# Patient Record
Sex: FEMALE | Race: BLACK OR AFRICAN AMERICAN | Employment: PART TIME | URBAN - METROPOLITAN AREA
[De-identification: names, ages, dates, MRNs, and addresses within clinical notes are randomized per-mention and may not be internally consistent; named-entity substitution may affect disease eponyms.]

---

## 2017-01-17 ENCOUNTER — OFFICE VISIT (OUTPATIENT)
Dept: ONCOLOGY | Age: 67
End: 2017-01-17

## 2017-01-17 VITALS
HEIGHT: 65 IN | DIASTOLIC BLOOD PRESSURE: 82 MMHG | RESPIRATION RATE: 16 BRPM | SYSTOLIC BLOOD PRESSURE: 121 MMHG | BODY MASS INDEX: 37.99 KG/M2 | HEART RATE: 68 BPM | OXYGEN SATURATION: 96 % | TEMPERATURE: 98.6 F | WEIGHT: 228 LBS

## 2017-01-17 DIAGNOSIS — C54.1 ENDOMETRIAL CANCER (HCC): Primary | ICD-10-CM

## 2017-01-17 NOTE — MR AVS SNAPSHOT
Visit Information Date & Time Provider Department Dept. Phone Encounter #  
 1/17/2017  1:15 PM MD Estela Tran [facility] [phone] Your Appointments 5/16/2017  9:45 AM  
Office Visit with MD Estela Tran [facility] [address]) Appt Note: 4mo f/u  
 [address] Upcoming Health Maintenance Date Due Hepatitis C Screening 1950 Pneumococcal 19-64 Highest Risk (1 of 3 - PCV13) 3/23/1971 DTaP/Tdap/Td series (1 - Tdap) 3/23/1973 PAP AKA CERVICAL CYTOLOGY 3/23/1973 BREAST CANCER SCRN MAMMOGRAM 3/23/2002 FOBT Q 1 YEAR AGE 50-75 3/23/2002 ZOSTER VACCINE AGE 60> 3/23/2012 INFLUENZA AGE 9 TO ADULT 8/1/2016 Allergies as of 1/17/2017  Review Complete On: 1/17/2017 By: Myra Gleason MD  
  
 Severity Noted Reaction Type Reactions Shellfish Derived  12/19/2016    Other (comments) Was told not to take it. Current Immunizations  Reviewed on 12/19/2016 No immunizations on file. Not reviewed this visit You Were Diagnosed With   
  
 Codes Comments [facility]    -  Primary ICD-10-CM: C54.1 ICD-9-CM: 182. 0 Vitals BP Pulse Temp Resp Height(growth percentile) Weight(growth percentile) 121/82 (BP 1 Location: Right arm, BP Patient Position: Sitting) 68 98.6 °F (37 °C) (Oral) 16 5' 5\" (1.651 m) 228 lb (103.4 kg) SpO2 BMI OB Status Smoking Status 96% 37.94 kg/m2 Hysterectomy Current Every Day Smoker Vitals History BMI and BSA Data Body Mass Index Body Surface Area  
 37.94 kg/m 2 2.18 m 2 Preferred Pharmacy Pharmacy Name Phone [facility] [phone] Your Updated Medication List  
  
   
 This list is accurate as of: 1/17/17  2:19 PM.  Always use your most recent med list.  
  
  
  
  
 ALEVE 220 mg Cap Generic drug:  naproxen sodium Take  by mouth as needed. allopurinol 100 mg tablet Commonly known as:  Genevieve Bolivar Take 100 mg by mouth daily (after breakfast). ECOTRIN 325 mg tablet Generic drug:  aspirin delayed-release Take 325 mg by mouth daily. hydroCHLOROthiazide 25 mg tablet Commonly known as:  HYDRODIURIL Take 25 mg by mouth daily. levothyroxine 100 mcg tablet Commonly known as:  SYNTHROID Take  by mouth Daily (before breakfast). meloxicam 7.5 mg tablet Commonly known as:  MOBIC Take  by mouth daily. oxyCODONE-acetaminophen 5-325 mg per tablet Commonly known as:  PERCOCET Take 2 Tabs by mouth every four (4) hours as needed. Max Daily Amount: 12 Tabs. Indications: PAIN  
  
 potassium chloride SA 10 mEq capsule Commonly known as:  Bam Benders Take 10 mEq by mouth two (2) times a day. pravastatin 20 mg tablet Commonly known as:  PRAVACHOL Take 20 mg by mouth nightly. traMADol 50 mg tablet Commonly known as:  ULTRAM  
Take 50 mg by mouth every six (6) hours as needed for Pain. VITAMIN D3 2,000 unit Tab Generic drug:  cholecalciferol (vitamin D3) Take  by mouth daily. Introducing [facility]! Kerri Chilel introduces [employer]sembl[employer] patient portal. Now you can access parts of your medical record, email your doctor's office, and request medication refills online. 1. In your internet browser, go to [email or web address] 2. Click on the First Time User? Click Here link in the Sign In box. You will see the New Member Sign Up page. 3. Enter your Assembla Access Code exactly as it appears below. You will not need to use this code after youve completed the sign-up process. If you do not sign up before the expiration date, you must request a new code. · Multistat Access Code: [email or web address] Expires: 3/13/2017 10:37 AM 
 
4. Enter the last four digits of your Social Security Number (xxxx) and Date of Birth (mm/dd/yyyy) as indicated and click Submit. You will be taken to the next sign-up page. 5. Create a Multistat ID. This will be your Multistat login ID and cannot be changed, so think of one that is secure and easy to remember. 6. Create a Multistat password. You can change your password at any time. 7. Enter your Password Reset Question and Answer. This can be used at a later time if you forget your password. 8. Enter your e-mail address. You will receive e-mail notification when new information is available in [address] 9. Click Sign Up. You can now view and download portions of your medical record. 10. Click the Download Summary menu link to download a portable copy of your medical information. If you have questions, please visit the Frequently Asked Questions section of the Multistat website. Remember, Multistat is NOT to be used for urgent needs. For medical emergencies, dial 911. Now available from your iPhone and Android! Please provide this summary of care documentation to your next provider. Your primary care clinician is listed as Jak Mcallister. If you have any questions after today's visit, please call [phone].

## 2017-01-17 NOTE — PROGRESS NOTES
[facility]  [address] [place] [address], [address], [address] [id number] [phone]  Nanette Walton       Postoperative Office Note  Patient ID:  Name: Ronna Mejia  MRM: [id number]  : 1950/66 y.o. Date: 2017    SUBJECTIVE:    This is a 61 y.o.   female new diagnosis of Stage IAG2 endometrial cancer s/p TLH and BSO on 2016  Currently she has no problems with eating, bowel movements, voiding, or their wound  Appetite is normal. Eating a regular diet without difficulty. Urinating without difficulty. Bowel movements are regular. The patient is not having any pain. Her pathology revealed: 16     SPECIMEN:   Uterus, cervix, bilateral fallopian tubes, bilateral ovaries. PROCEDURE: SIMPLE HYSTERECTOMY, BILATERAL SALPINGO-OOPHORECTOMY. LYMPH NODE SAMPLING: NOT PERFORMED. SPECIMEN INTEGRITY: INTACT HYSTERECTOMY SPECIMEN. TUMOR SIZE: 4.4 X 4.0 X 1.5 CM. HISTOLOGIC TYPE: ENDOMETRIOID ADENOCARCINOMA. HISTOLOGIC GRADE: FIGO GRADE 2. MYOMETRIAL INVASION: NOT IDENTIFIED. TUMOR INVOLVEMENT OF CERVIX: NOT INVOLVED. EXTENT OF INVOLVEMENT OF OTHER ORGANS:   Right Ovary: Not involved. Left Ovary: Not involved. Right Fallopian Tube: Not involved. Left Fallopian Tube: Not involved. Serosa: Not involved. PERITONEAL WASHINGS (Jey Cowan): NO MALIGNANT CELLS FOUND. LYMPH/VASCULAR INVASION: NOT IDENTIFIED. PATHOLOGIC STAGING: pT1a, NX, MX.   ANCILLARY STUDIES:   Mismatch Repair IHC Panel: Pending. To be issued as an addendum. Estrogen Receptor (ER) Status: POSITIVE (95%). Progesterone Receptor (PgR) Status: POSITIVE (95%). Medications:     Current Outpatient Prescriptions on File Prior to Visit   Medication Sig Dispense Refill    aspirin delayed-release (ECOTRIN) 325 mg tablet Take 325 mg by mouth daily.       cholecalciferol, vitamin D3, (VITAMIN D3) 2,000 unit tab Take  by mouth daily.      potassium chloride SA (MICRO-K) 10 mEq capsule Take 10 mEq by mouth two (2) times a day.  hydroCHLOROthiazide (HYDRODIURIL) 25 mg tablet Take 25 mg by mouth daily.  levothyroxine (SYNTHROID) 100 mcg tablet Take  by mouth Daily (before breakfast).  pravastatin (PRAVACHOL) 20 mg tablet Take 20 mg by mouth nightly.  allopurinol (ZYLOPRIM) 100 mg tablet Take 100 mg by mouth daily (after breakfast).  meloxicam (MOBIC) 7.5 mg tablet Take  by mouth daily.  naproxen sodium (ALEVE) 220 mg cap Take  by mouth as needed.  oxyCODONE-acetaminophen (PERCOCET) 5-325 mg per tablet Take 2 Tabs by mouth every four (4) hours as needed. Max Daily Amount: 12 Tabs. Indications: PAIN 60 Tab 0    traMADol (ULTRAM) 50 mg tablet Take 50 mg by mouth every six (6) hours as needed for Pain. No current facility-administered medications on file prior to visit. Allergies: Allergies   Allergen Reactions    Shellfish Derived Other (comments)     Was told not to take it. OBJECTIVE:    Vitals:   Visit Vitals    /82 (BP 1 Location: Right arm, BP Patient Position: Sitting)    Pulse 68    Temp 98.6 °F (37 °C) (Oral)    Resp 16    Ht 5' 5\" (1.651 m)    Wt 103.4 kg (228 lb)    SpO2 96%    BMI 37.94 kg/m2       Physical Examination:    General:  alert, cooperative, no distress   Abdomen: soft, bowel sounds active, non-tender   Incision: healing well   Pelvic: NEFG, Spec exam revealed vaginal cuff intact, BME revealed vaginal cuff intact, nontender   Rectal: not done   Extremity:   extremities normal, atraumatic, no cyanosis or edema     IMPRESSION/PLAN:    Terri Campbell is Doing well postoperatively. .  She has a working diagnosis of Stage IAG2 endometrial cancer.   -reviewed her pathology and favorable prognosis with low risk of recurrence and no need for adjuvant treatment   -reviewed surveillance strategy including exams every 4 months x 2 years, then every 6 months x 3 years then annually   -discussed s/sx of recurrence   -all of patients questions and concerns address   -will plan for f/u in 4 months      [facility]  1/17/2017/12:56 PM

## 2017-05-16 ENCOUNTER — OFFICE VISIT (OUTPATIENT)
Dept: ONCOLOGY | Age: 67
End: 2017-05-16

## 2017-05-16 VITALS
HEART RATE: 62 BPM | WEIGHT: 225 LBS | OXYGEN SATURATION: 96 % | RESPIRATION RATE: 18 BRPM | BODY MASS INDEX: 37.44 KG/M2 | DIASTOLIC BLOOD PRESSURE: 75 MMHG | SYSTOLIC BLOOD PRESSURE: 125 MMHG | TEMPERATURE: 98.1 F

## 2017-05-16 DIAGNOSIS — Z85.42 HISTORY OF ENDOMETRIAL CANCER: Primary | ICD-10-CM

## 2017-05-16 NOTE — MR AVS SNAPSHOT
Visit Information Date & Time Provider Department Dept. Phone Encounter #  
 5/16/2017  9:45 AM MD Yue Morgan [facility] [phone] [id number] Your Appointments 5/16/2017  9:45 AM  
Office Visit with MD Yue Morgan [facility]) Appt Note: 4mo f/u  
 [address] 9/15/2017  9:00 AM  
Office Visit with MD Yue Morgan [facility]) Appt Note: 4MO F/U  
 [address]  
[phone] Upcoming Health Maintenance Date Due Hepatitis C Screening 1950 DTaP/Tdap/Td series (1 - Tdap) 3/23/1973 BREAST CANCER SCRN MAMMOGRAM 3/23/2002 FOBT Q 1 YEAR AGE 50-75 3/23/2002 ZOSTER VACCINE AGE 60> 3/23/2012 GLAUCOMA SCREENING Q2Y 3/23/2017 OSTEOPOROSIS SCREENING (DEXA) 3/23/2017 Pneumococcal 65+ High/Highest Risk (1 of 2 - PCV13) 3/23/2017 MEDICARE YEARLY EXAM 3/23/2017 INFLUENZA AGE 9 TO ADULT 8/1/2017 Allergies as of 5/16/2017  Review Complete On: 5/16/2017 By: Stewart Diaz MD  
  
 Severity Noted Reaction Type Reactions Shellfish Derived  12/19/2016    Other (comments) Was told not to take it. Current Immunizations  Reviewed on 12/19/2016 No immunizations on file. Not reviewed this visit You Were Diagnosed With   
  
 Codes Comments History of endometrial cancer    -  Primary ICD-10-CM: Z85.42 
ICD-9-CM: V10.42 Vitals BP Pulse Temp Resp Weight(growth percentile) SpO2  
 125/75 62 98.1 °F (36.7 °C) (Oral) 18 225 lb (102.1 kg) 96% BMI OB Status Smoking Status 37.44 kg/m2 Hysterectomy Current Every Day Smoker BMI and BSA Data Body Mass Index Body Surface Area 37.44 kg/m 2 2.16 m 2 Preferred Pharmacy Pharmacy Name Phone 100 Kimani M[facility] [phone] Your Updated Medication List  
  
   
This list is accurate as of: 5/16/17  9:33 AM.  Always use your most recent med list.  
  
  
  
  
 ALEVE 220 mg Cap Generic drug:  naproxen sodium Take  by mouth as needed. allopurinol 100 mg tablet Commonly known as:  Ro Poisson Take 100 mg by mouth daily (after breakfast). ECOTRIN 325 mg tablet Generic drug:  aspirin delayed-release Take 325 mg by mouth daily. hydroCHLOROthiazide 25 mg tablet Commonly known as:  HYDRODIURIL Take 25 mg by mouth daily. levothyroxine 100 mcg tablet Commonly known as:  SYNTHROID Take  by mouth Daily (before breakfast). meloxicam 7.5 mg tablet Commonly known as:  MOBIC Take  by mouth daily. oxyCODONE-acetaminophen 5-325 mg per tablet Commonly known as:  PERCOCET Take 2 Tabs by mouth every four (4) hours as needed. Max Daily Amount: 12 Tabs. Indications: PAIN  
  
 potassium chloride SA 10 mEq capsule Commonly known as:  Jaci Cram Take 10 mEq by mouth two (2) times a day. pravastatin 20 mg tablet Commonly known as:  PRAVACHOL Take 20 mg by mouth nightly. traMADol 50 mg tablet Commonly known as:  ULTRAM  
Take 50 mg by mouth every six (6) hours as needed for Pain. VITAMIN D3 2,000 unit Tab Generic drug:  cholecalciferol (vitamin D3) Take  by mouth daily. Introducing [facility] & HEALTH SERVICES! [facility] introduces [employer] patient portal. Now you can access parts of your medical record, email your doctor's office, and request medication refills online. 1. In your internet browser, go to [email or web address] 2. Click on the First Time User? Click Here link in the Sign In box. You will see the New Member Sign Up page. 3. Enter your Sweetgreen Access Code exactly as it appears below. You will not need to use this code after youve completed the sign-up process. If you do not sign up before the expiration date, you must request a new code. · Sweetgreen Access Code: [email or web address] Expires: 8/14/2017  9:33 AM 
 
4. Enter the last four digits of your Social Security Number (xxxx) and Date of Birth (mm/dd/yyyy) as indicated and click Submit. You will be taken to the next sign-up page. 5. Create a Sweetgreen ID. This will be your Sweetgreen login ID and cannot be changed, so think of one that is secure and easy to remember. 6. Create a Sweetgreen password. You can change your password at any time. 7. Enter your Password Reset Question and Answer. This can be used at a later time if you forget your password. 8. Enter your e-mail address. You will receive e-mail notification when new information is available in [address] 9. Click Sign Up. You can now view and download portions of your medical record. 10. Click the Download Summary menu link to download a portable copy of your medical information. If you have questions, please visit the Frequently Asked Questions section of the Sweetgreen website. Remember, Sweetgreen is NOT to be used for urgent needs. For medical emergencies, dial 911. Now available from your iPhone and Android! Please provide this summary of care documentation to your next provider. Your primary care clinician is listed as Virginia Tillman. If you have any questions after today's visit, please call [phone].

## 2017-05-16 NOTE — PROGRESS NOTES
[facility]  [facility], [address], [address] [place]  [phone]  Dorothy Meadows DO      Patient ID:  Name: Soha Officer  MRM: [id number]  : 1950/67 y.o. Date: 2017    SUBJECTIVE:  This is a 74 y.o.   female new diagnosis of Stage IAG2 endometrial cancer s/p TLH and BSO on 2016. Patient denies any Gyn symptoms. Patient specifically denies any abnormal vaginal bleeding, vaginal discharge, or vaginal irritation. Patient also denies abdominal pain, pelvic pain, or abdominal bloating. Patient is voiding without difficulty and bowel movements are regular. Her pathology revealed: 16  SPECIMEN:   Uterus, cervix, bilateral fallopian tubes, bilateral ovaries. PROCEDURE: SIMPLE HYSTERECTOMY, BILATERAL SALPINGO-OOPHORECTOMY. LYMPH NODE SAMPLING: NOT PERFORMED. SPECIMEN INTEGRITY: INTACT HYSTERECTOMY SPECIMEN. TUMOR SIZE: 4.4 X 4.0 X 1.5 CM. HISTOLOGIC TYPE: ENDOMETRIOID ADENOCARCINOMA. HISTOLOGIC GRADE: FIGO GRADE 2. MYOMETRIAL INVASION: NOT IDENTIFIED. TUMOR INVOLVEMENT OF CERVIX: NOT INVOLVED. EXTENT OF INVOLVEMENT OF OTHER ORGANS:   Right Ovary: Not involved. Left Ovary: Not involved. Right Fallopian Tube: Not involved. Left Fallopian Tube: Not involved. Serosa: Not involved. PERITONEAL WASHINGS ([place] Null): NO MALIGNANT CELLS FOUND. LYMPH/VASCULAR INVASION: NOT IDENTIFIED. PATHOLOGIC STAGING: pT1a, NX, MX.   ANCILLARY STUDIES:   Mismatch Repair IHC Panel: Pending. To be issued as an addendum. Estrogen Receptor (ER) Status: POSITIVE (95%). Progesterone Receptor (PgR) Status: POSITIVE (95%). Medications:     Current Outpatient Prescriptions on File Prior to Visit   Medication Sig Dispense Refill    aspirin delayed-release (ECOTRIN) 325 mg tablet Take 325 mg by mouth daily.       cholecalciferol, vitamin D3, (VITAMIN D3) 2,000 unit tab Take  by mouth daily.  potassium chloride SA (MICRO-K) 10 mEq capsule Take 10 mEq by mouth two (2) times a day.  hydroCHLOROthiazide (HYDRODIURIL) 25 mg tablet Take 25 mg by mouth daily.  levothyroxine (SYNTHROID) 100 mcg tablet Take  by mouth Daily (before breakfast).  pravastatin (PRAVACHOL) 20 mg tablet Take 20 mg by mouth nightly.  allopurinol (ZYLOPRIM) 100 mg tablet Take 100 mg by mouth daily (after breakfast).  meloxicam (MOBIC) 7.5 mg tablet Take  by mouth daily.  traMADol (ULTRAM) 50 mg tablet Take 50 mg by mouth every six (6) hours as needed for Pain.  naproxen sodium (ALEVE) 220 mg cap Take  by mouth as needed.  oxyCODONE-acetaminophen (PERCOCET) 5-325 mg per tablet Take 2 Tabs by mouth every four (4) hours as needed. Max Daily Amount: 12 Tabs. Indications: PAIN 60 Tab 0     No current facility-administered medications on file prior to visit. Allergies: Allergies   Allergen Reactions    Shellfish Derived Other (comments)     Was told not to take it.         OBJECTIVE:    Vitals:   Visit Vitals    /75    Pulse 62    Temp 98.1 °F (36.7 °C) (Oral)    Resp 18    Wt 102.1 kg (225 lb)    SpO2 96%    BMI 37.44 kg/m2       Physical Examination:    General:  alert, cooperative, no distress   Abdomen: soft, bowel sounds active, non-tender   Incision: healing well   Pelvic: NEFG, Spec exam revealed vaginal cuff intact, BME revealed vaginal cuff intact, nontender   Rectal: not done   Extremity:   extremities normal, atraumatic, no cyanosis or edema     IMPRESSION/PLAN:     Stage IAG2 endometrial cancer, who is clinically MITESH   -reviewed her pathology and favorable prognosis with low risk of recurrence and no need for adjuvant treatment   -reviewed surveillance strategy including exams every 4 months x 2 years, then every 6 months x 3 years then annually   -discussed s/sx of recurrence   -all of patients questions and concerns address   -will plan for f/u in 4 months    Total time spent was 25 minutes regarding diagnosis of endometrial cancer and >50% of this time was spent counseling and coordinating care.     Eli Everett DO  Gynecologic Oncology  5/16/2017/12:56 PM

## 2018-01-02 ENCOUNTER — OFFICE VISIT (OUTPATIENT)
Dept: ONCOLOGY | Age: 68
End: 2018-01-02

## 2018-01-02 ENCOUNTER — TELEPHONE (OUTPATIENT)
Dept: ONCOLOGY | Age: 68
End: 2018-01-02

## 2018-01-02 VITALS
RESPIRATION RATE: 18 BRPM | WEIGHT: 238.6 LBS | BODY MASS INDEX: 39.75 KG/M2 | HEART RATE: 86 BPM | SYSTOLIC BLOOD PRESSURE: 131 MMHG | HEIGHT: 65 IN | OXYGEN SATURATION: 98 % | TEMPERATURE: 98.1 F | DIASTOLIC BLOOD PRESSURE: 78 MMHG

## 2018-01-02 DIAGNOSIS — C54.1 ENDOMETRIAL CANCER (HCC): Primary | ICD-10-CM

## 2018-01-02 RX ORDER — LOSARTAN POTASSIUM AND HYDROCHLOROTHIAZIDE 12.5; 5 MG/1; MG/1
TABLET ORAL
COMMUNITY
Start: 2017-11-07

## 2018-01-02 NOTE — PROGRESS NOTES
[facility]  [address] [place] [address], [address], [address] [address] [phone]  Amrit Perry DO      Patient ID:  Name: Tata Ricardo  MRM: [id number]  : 1950/67 y.o. Date: 2018    SUBJECTIVE:  This is a 74 y.o.   female new diagnosis of Stage IAG2 endometrial cancer s/p TLH and BSO on 2016. Patient denies any Gyn symptoms. Patient specifically denies any abnormal vaginal bleeding, vaginal discharge, or vaginal irritation. Patient also denies abdominal pain, pelvic pain, or abdominal bloating. Patient is voiding without difficulty and bowel movements are regular. Her pathology revealed: 16  SPECIMEN:   Uterus, cervix, bilateral fallopian tubes, bilateral ovaries. PROCEDURE: SIMPLE HYSTERECTOMY, BILATERAL SALPINGO-OOPHORECTOMY. LYMPH NODE SAMPLING: NOT PERFORMED. SPECIMEN INTEGRITY: INTACT HYSTERECTOMY SPECIMEN. TUMOR SIZE: 4.4 X 4.0 X 1.5 CM. HISTOLOGIC TYPE: ENDOMETRIOID ADENOCARCINOMA. HISTOLOGIC GRADE: FIGO GRADE 2. MYOMETRIAL INVASION: NOT IDENTIFIED. TUMOR INVOLVEMENT OF CERVIX: NOT INVOLVED. EXTENT OF INVOLVEMENT OF OTHER ORGANS:   Right Ovary: Not involved. Left Ovary: Not involved. Right Fallopian Tube: Not involved. Left Fallopian Tube: Not involved. Serosa: Not involved. PERITONEAL WASHINGS ([facility]): NO MALIGNANT CELLS FOUND. LYMPH/VASCULAR INVASION: NOT IDENTIFIED. PATHOLOGIC STAGING: pT1a, NX, MX.   ANCILLARY STUDIES:   Mismatch Repair IHC Panel: Pending. To be issued as an addendum. Estrogen Receptor (ER) Status: POSITIVE (95%). Progesterone Receptor (PgR) Status: POSITIVE (95%). Medications:     Current Outpatient Prescriptions on File Prior to Visit   Medication Sig Dispense Refill    aspirin delayed-release (ECOTRIN) 325 mg tablet Take 325 mg by mouth daily.       cholecalciferol, vitamin D3, (VITAMIN D3) 2,000 unit tab Take  by mouth daily.      levothyroxine (SYNTHROID) 100 mcg tablet Take  by mouth Daily (before breakfast).  pravastatin (PRAVACHOL) 20 mg tablet Take 20 mg by mouth nightly.  allopurinol (ZYLOPRIM) 100 mg tablet Take 100 mg by mouth daily (after breakfast).  meloxicam (MOBIC) 7.5 mg tablet Take  by mouth daily.  traMADol (ULTRAM) 50 mg tablet Take 50 mg by mouth every six (6) hours as needed for Pain.  oxyCODONE-acetaminophen (PERCOCET) 5-325 mg per tablet Take 2 Tabs by mouth every four (4) hours as needed. Max Daily Amount: 12 Tabs. Indications: PAIN 60 Tab 0    potassium chloride SA (MICRO-K) 10 mEq capsule Take 10 mEq by mouth two (2) times a day.  naproxen sodium (ALEVE) 220 mg cap Take  by mouth as needed. No current facility-administered medications on file prior to visit. Allergies: Allergies   Allergen Reactions    Shellfish Derived Other (comments)     Was told not to take it.         OBJECTIVE:    Vitals:   Visit Vitals    /78 (BP 1 Location: Right arm, BP Patient Position: Sitting)    Pulse 86    Temp 98.1 °F (36.7 °C) (Oral)    Resp 18    Ht 5' 5\" (1.651 m)    Wt 108.2 kg (238 lb 9.6 oz)    SpO2 98%    BMI 39.71 kg/m2       Physical Examination:    General:  alert, cooperative, no distress   Abdomen: soft, bowel sounds active, non-tender   Incision: healing well   Pelvic: NEFG, Spec exam revealed vaginal cuff intact, BME revealed vaginal cuff intact, nontender   Rectal: not done   Extremity:   extremities normal, atraumatic, no cyanosis or edema     IMPRESSION/PLAN:     Stage IAG2 endometrial cancer, who is clinically MITESH   -reviewed her pathology and favorable prognosis with low risk of recurrence and no need for adjuvant treatment   -reviewed surveillance strategy including exams every 4 months x 2 years, then every 6 months x 3 years then annually   -discussed s/sx of recurrence   -all of patients questions and concerns address   -will plan for f/u in 4 months    Total time spent was 25 minutes regarding diagnosis of endometrial cancer and >50% of this time was spent counseling and coordinating care.     Brody GONZALEZ-[address]  Gynecologic Oncology  1/2/2018/12:56 PM

## 2018-01-02 NOTE — MR AVS SNAPSHOT
Visit Information Date & Time Provider Department Dept. Phone Encounter #  
 1/2/2018  9:45 AM Justina Juarez MD [facility] [phone] Upcoming Health Maintenance Date Due Hepatitis C Screening 1950 DTaP/Tdap/Td series (1 - Tdap) 3/23/1973 FOBT Q 1 YEAR AGE 50-75 3/23/2002 ZOSTER VACCINE AGE 60> 1/23/2012 GLAUCOMA SCREENING Q2Y 3/23/2017 OSTEOPOROSIS SCREENING (DEXA) 3/23/2017 Pneumococcal 65+ High/Highest Risk (1 of 2 - PCV13) 3/23/2017 MEDICARE YEARLY EXAM 3/23/2017 Influenza Age 5 to Adult 8/1/2017 Allergies as of 1/2/2018  Review Complete On: 1/2/2018 By: Arlene Cannon NP Severity Noted Reaction Type Reactions Shellfish Derived  12/19/2016    Other (comments) Was told not to take it. Current Immunizations  Reviewed on 12/19/2016 No immunizations on file. Not reviewed this visit You Were Diagnosed With   
  
 Codes Comments Endometrial cancer ([facility]    -  Primary ICD-10-CM: C54.1 ICD-9-CM: 182. 0 Vitals BP Pulse Temp Resp Height(growth percentile) Weight(growth percentile) 131/78 (BP 1 Location: Right arm, BP Patient Position: Sitting) 86 98.1 °F (36.7 °C) (Oral) 18 5' 5\" (1.651 m) 238 lb 9.6 oz (108.2 kg) SpO2 BMI OB Status Smoking Status 98% 39.71 kg/m2 Hysterectomy Former Smoker BMI and BSA Data Body Mass Index Body Surface Area  
 39.71 kg/m 2 2.23 m 2 Preferred Pharmacy Pharmacy Name Phone [address] [facility] [phone] Your Updated Medication List  
  
   
This list is accurate as of: 1/2/18 10:03 AM.  Always use your most recent med list.  
  
  
  
  
 allopurinol 100 mg tablet Commonly known as:  Nena Gallus Take 100 mg by mouth daily (after breakfast). ECOTRIN 325 mg tablet Generic drug:  aspirin delayed-release Take 325 mg by mouth daily. levothyroxine 100 mcg tablet Commonly known as:  SYNTHROID Take  by mouth Daily (before breakfast). losartan-hydroCHLOROthiazide 50-12.5 mg per tablet Commonly known as:  HYZAAR  
  
 meloxicam 7.5 mg tablet Commonly known as:  MOBIC Take  by mouth daily. oxyCODONE-acetaminophen 5-325 mg per tablet Commonly known as:  PERCOCET Take 2 Tabs by mouth every four (4) hours as needed. Max Daily Amount: 12 Tabs. Indications: PAIN  
  
 pravastatin 20 mg tablet Commonly known as:  PRAVACHOL Take 20 mg by mouth nightly. traMADol 50 mg tablet Commonly known as:  ULTRAM  
Take 50 mg by mouth every six (6) hours as needed for Pain. VITAMIN D3 2,000 unit Tab Generic drug:  cholecalciferol (vitamin D3) Take  by mouth daily. Introducing [facility]! [facility] introduces Acer patient portal. Now you can access parts of your medical record, email your doctor's office, and request medication refills online. 1. In your internet browser, go to [email or web address] 2. Click on the First Time User? Click Here link in the Sign In box. You will see the New Member Sign Up page. 3. Enter your Acer Access Code exactly as it appears below. You will not need to use this code after youve completed the sign-up process. If you do not sign up before the expiration date, you must request a new code. · Acer Access Code: [email or web address] Expires: 4/2/2018 10:03 AM 
 
4. Enter the last four digits of your Social Security Number (xxxx) and Date of Birth (mm/dd/yyyy) as indicated and click Submit. You will be taken to the next sign-up page. 5. Create a Vital Therapiest ID. This will be your Acer login ID and cannot be changed, so think of one that is secure and easy to remember. 6. Create a Vital Therapiest password. You can change your password at any time. 7. Enter your Password Reset Question and Answer. This can be used at a later time if you forget your password. 8. Enter your e-mail address. You will receive e-mail notification when new information is available in [address] 9. Click Sign Up. You can now view and download portions of your medical record. 10. Click the Download Summary menu link to download a portable copy of your medical information. If you have questions, please visit the Frequently Asked Questions section of the WEALTH at work website. Remember, WEALTH at work is NOT to be used for urgent needs. For medical emergencies, dial 911. Now available from your iPhone and Android! Please provide this summary of care documentation to your next provider. Your primary care clinician is listed as Eriberto Serna. If you have any questions after today's visit, please call [phone].

## 2018-01-02 NOTE — TELEPHONE ENCOUNTER
Left voicemail for the referral coordinater at Dr. Mikie Carias office requesting a [employer] gold plus referral to Dr. Gigi Weiss. Patient presented to the office with a new insurance card for her visit today.

## 2018-01-02 NOTE — PROGRESS NOTES
Terri Lazo, a 74 y.o. female,  is here for   Chief Complaint   Patient presents with    Uterine Cancer     patient is here for surveillance. Visit Vitals    /78 (BP 1 Location: Right arm, BP Patient Position: Sitting)    Pulse 86    Temp 98.1 °F (36.7 °C) (Oral)    Resp 18    Ht 5' 5\" (1.651 m)    Wt 108.2 kg (238 lb 9.6 oz)    SpO2 98%    BMI 39.71 kg/m2       Patient denies any abdominal or pelvic pain. No unusual bleeding, discharge, or irritation. No changes in bladder or bowel habits. 1. Have you been to the ER, urgent care clinic since your last visit? Hospitalized since your last visit? No    2. Have you seen or consulted any other health care providers outside of the [address] [place] [address] since your last visit? Include any pap smears or colon screening.  No

## 2018-05-15 ENCOUNTER — OFFICE VISIT (OUTPATIENT)
Dept: ONCOLOGY | Age: 68
End: 2018-05-15

## 2018-05-15 VITALS
OXYGEN SATURATION: 98 % | HEART RATE: 72 BPM | BODY MASS INDEX: 39.58 KG/M2 | TEMPERATURE: 98 F | DIASTOLIC BLOOD PRESSURE: 78 MMHG | RESPIRATION RATE: 18 BRPM | WEIGHT: 237.6 LBS | SYSTOLIC BLOOD PRESSURE: 125 MMHG | HEIGHT: 65 IN

## 2018-05-15 DIAGNOSIS — C54.1 ENDOMETRIAL CANCER (HCC): Primary | ICD-10-CM

## 2018-05-15 RX ORDER — POLYETHYLENE GLYCOL 3350 17 G/17G
17 POWDER, FOR SOLUTION ORAL
COMMUNITY

## 2018-05-15 NOTE — PATIENT INSTRUCTIONS
Below is some information on the condition you previously were diagnosed with. Please call the office ASAP if you begin to experience the following symptoms: Persistent or worsening abdominal or pelvic pain, any unusual vaginal bleeding, discharge, odor, or irritation, and any changes in bladder or bowel habits such as constipation, diarrhea, burning, or general discomfort. Please call the office if you have any other questions or concerns. Uterine Cancer: Care Instructions  Your Care Instructions  Uterine cancer is the rapid growth of abnormal cells that line the uterus. It also is called endometrial cancer. These cells may spread to nearby organs, lymph glands, or distant organs. Uterine cancer can be cured most often when found early. Treatment may include surgery to remove the uterus, ovaries, fallopian tubes, and sometimes the pelvic lymph nodes. Radiation and hormones to stop cancer growth also are sometimes used. Chemotherapy may be used if the cancer has spread. Having cancer can be scary. You may feel many emotions and may need some help coping. Seek out family, friends, and counselors for support. You can do things at home to make yourself feel better while you go through treatment. You also can call the [employer] (2-462.249.4814) or visit its website at [address] Trendsetters for more information. Follow-up care is a key part of your treatment and safety. Be sure to make and go to all appointments, and call your doctor if you are having problems. It's also a good idea to know your test results and keep a list of the medicines you take. How can you care for yourself at home? · Take your medicines exactly as prescribed. Call your doctor if you think you are having a problem with your medicine. You may get medicine for nausea and vomiting if you have these side effects. · Eat healthy food.  If you do not feel like eating, try to eat food that has protein and extra calories to keep up your strength and prevent weight loss. Drink liquid meal replacements for extra calories and protein. Try to eat your main meal early. · Get some physical activity every day, but do not get too tired. Keep doing the hobbies you enjoy as your energy allows. · Take steps to control your stress and workload. Learn relaxation techniques. ¨ Share your feelings. Stress and tension affect our emotions. By expressing your feelings to others, you may be able to understand and cope with them. ¨ Consider joining a support group. Talking about a problem with your spouse, a good friend, or other people with similar problems is a good way to reduce tension and stress. ¨ Express yourself through art. Try writing, dance, art, or crafts to relieve tension. Some dance, writing, or art groups may be available just for people who have cancer. ¨ Be kind to your body and mind. Getting enough sleep, eating a healthy diet, and taking time to do things you enjoy can contribute to an overall feeling of balance in your life and help reduce stress. ¨ Get help if you need it. Discuss your concerns with your doctor or counselor. · If you are vomiting or have diarrhea:  ¨ Drink plenty of fluids (enough so that your urine is light yellow or clear like water) to prevent dehydration. Choose water and other caffeine-free clear liquids. If you have kidney, heart, or liver disease and have to limit fluids, talk with your doctor before you increase the amount of fluids you drink. ¨ When you are able to eat, try clear soups, mild foods, and liquids until all symptoms are gone for 12 to 48 hours. Other good choices include dry toast, crackers, cooked cereal, and gelatin dessert, such as Jell-O.  · Take care of your urinary tract to prevent problems such as infection, which can be caused by uterine cancer and its treatment. Limit drinks with caffeine, drink plenty of fluids, and urinate every 3 to 4 hours.   · If you have not already done so, prepare a list of advance directives. Advance directives are instructions to your doctor and family members about what kind of care you want if you become unable to speak or express yourself. When should you call for help? Call 911 anytime you think you may need emergency care. For example, call if:  ? · You passed out (lost consciousness). ?Call your doctor now or seek immediate medical care if:  ? · You have a fever. ? · You have abnormal bleeding. ? · You have new or worse pain. ? · You think you have an infection. ? · You have new symptoms, such as a cough, belly pain, vomiting, diarrhea, or a rash. ? Watch closely for changes in your health, and be sure to contact your doctor if:  ? · You are much more tired than usual.   ? · You have swollen glands in your armpits, groin, or neck. ? · You do not get better as expected. Where can you learn more? Go to http://bryan-christin.info/. Enter E343 in the search box to learn more about \"Uterine Cancer: Care Instructions. \"  Current as of: May 12, 2017  Content Version: 11.4  © 0555-3167 [employer]. Care instructions adapted under license by [employer] (which disclaims liability or warranty for this information). If you have questions about a medical condition or this instruction, always ask your healthcare professional. [address] any warranty or liability for your use of this information.

## 2018-05-15 NOTE — PROGRESS NOTES
[facility]  [address] [place] [address], [address], [address] [address] [phone]  Krysten Snyder DO      Patient ID:  Name: Parmjit Pérez  MRM: [id number]  : 1950/68 y.o. Date: 5/15/2018    SUBJECTIVE:  This is a 79 y.o.   female new diagnosis of Stage IAG2 endometrial cancer s/p TLH and BSO on 2016. Patient denies any Gyn symptoms. Patient specifically denies any abnormal vaginal bleeding, vaginal discharge, or vaginal irritation. Patient also denies abdominal pain, pelvic pain, or abdominal bloating. Patient is voiding without difficulty. Admits to occasional constipation that began a few weeks ago. Her pathology revealed: 16  SPECIMEN:   Uterus, cervix, bilateral fallopian tubes, bilateral ovaries. PROCEDURE: SIMPLE HYSTERECTOMY, BILATERAL SALPINGO-OOPHORECTOMY. LYMPH NODE SAMPLING: NOT PERFORMED. SPECIMEN INTEGRITY: INTACT HYSTERECTOMY SPECIMEN. TUMOR SIZE: 4.4 X 4.0 X 1.5 CM. HISTOLOGIC TYPE: ENDOMETRIOID ADENOCARCINOMA. HISTOLOGIC GRADE: FIGO GRADE 2. MYOMETRIAL INVASION: NOT IDENTIFIED. TUMOR INVOLVEMENT OF CERVIX: NOT INVOLVED. EXTENT OF INVOLVEMENT OF OTHER ORGANS:   Right Ovary: Not involved. Left Ovary: Not involved. Right Fallopian Tube: Not involved. Left Fallopian Tube: Not involved. Serosa: Not involved. PERITONEAL WASHINGS (Thomasena Hum): NO MALIGNANT CELLS FOUND. LYMPH/VASCULAR INVASION: NOT IDENTIFIED. PATHOLOGIC STAGING: pT1a, NX, MX.   ANCILLARY STUDIES:   Mismatch Repair IHC Panel: Pending. To be issued as an addendum. Estrogen Receptor (ER) Status: POSITIVE (95%). Progesterone Receptor (PgR) Status: POSITIVE (95%).        Medications:     Current Outpatient Prescriptions on File Prior to Visit   Medication Sig Dispense Refill    losartan-hydroCHLOROthiazide (HYZAAR) 50-12.5 mg per tablet       aspirin delayed-release (ECOTRIN) 325 mg tablet Take 325 mg by mouth daily.  cholecalciferol, vitamin D3, (VITAMIN D3) 2,000 unit tab Take  by mouth daily.  levothyroxine (SYNTHROID) 100 mcg tablet Take  by mouth Daily (before breakfast).  pravastatin (PRAVACHOL) 20 mg tablet Take 20 mg by mouth nightly.  allopurinol (ZYLOPRIM) 100 mg tablet Take 100 mg by mouth daily (after breakfast).  meloxicam (MOBIC) 7.5 mg tablet Take  by mouth daily.  traMADol (ULTRAM) 50 mg tablet Take 50 mg by mouth every six (6) hours as needed for Pain.  oxyCODONE-acetaminophen (PERCOCET) 5-325 mg per tablet Take 2 Tabs by mouth every four (4) hours as needed. Max Daily Amount: 12 Tabs. Indications: PAIN 60 Tab 0     No current facility-administered medications on file prior to visit. Allergies: Allergies   Allergen Reactions    Shellfish Derived Other (comments)     Was told not to take it.         OBJECTIVE:    Vitals:   Visit Vitals    /78 (BP 1 Location: Right arm, BP Patient Position: Sitting)    Pulse 72    Temp 98 °F (36.7 °C) (Oral)    Resp 18    Ht 5' 5\" (1.651 m)    Wt 107.8 kg (237 lb 9.6 oz)    SpO2 98%    BMI 39.54 kg/m2       Physical Examination:    General:  alert, cooperative, no distress   Abdomen: soft, bowel sounds active, non-tender   Incision: healing well   Pelvic: NEFG, Spec exam revealed vaginal cuff intact, BME revealed vaginal cuff intact, nontender   Rectal: not done   Extremity:   extremities normal, atraumatic, no cyanosis or edema     IMPRESSION/PLAN:     Stage IAG2 endometrial cancer, who is clinically MITESH   -reviewed her pathology and favorable prognosis with low risk of recurrence and no need for adjuvant treatment   -reviewed surveillance strategy including exams every 4 months x 2 years, then every 6 months x 3 years then annually   -discussed s/sx of recurrence   -all of patients questions and concerns addressed   -will plan for f/u in 4 months  Constipation, occasional   -reviewed bowel regimen recommendations including Colace, Miralax PRN, dietary fiber, hydration, ambulation   -patient to follow up with PCP or our office if symptoms persist    Total time spent was 25 minutes regarding diagnosis of endometrial cancer and >50% of this time was spent counseling and coordinating care.     Nelia GONZALEZ-[address]  Gynecologic Oncology  5/15/2018/12:56 PM

## 2018-05-15 NOTE — PROGRESS NOTES
Terri Kincaid, a 79 y.o. female,  is here for   Chief Complaint   Patient presents with    Uterine Cancer     4 month surveillance       Visit Vitals    /78 (BP 1 Location: Right arm, BP Patient Position: Sitting)    Pulse 72    Temp 98 °F (36.7 °C) (Oral)    Resp 18    Ht 5' 5\" (1.651 m)    Wt 107.8 kg (237 lb 9.6 oz)    SpO2 98%    BMI 39.54 kg/m2     Patient has had trouble with constipation recently, has been taking Miralax to help alleviate it, her last bowel movement was 05/13/2018. Patient denies any persistent or worsening abdominal or pelvic pain. Denies any unusual vaginal bleeding, discharge, irritation, or odor. No burning, discomfort, or irritation with urination. 1. Have you been to the ER, urgent care clinic since your last visit? Hospitalized since your last visit? No    2. Have you seen or consulted any other health care providers outside of the [facility] since your last visit? Include any pap smears or colon screening.  No

## 2018-05-15 NOTE — MR AVS SNAPSHOT
[facility] 
 
 
 [facility] [address] [place] [address] 
[phone] Patient: Lanie Ybarra MRN: [id number] HPZ:[id number] Visit Information Date & Time Provider Department Dept. Phone Encounter #  
 5/15/2018 10:15 AM Aide Durán MD [facility] [phone] [id number] Follow-up Instructions Return in about 4 months (around 9/15/2018). Upcoming Health Maintenance Date Due Hepatitis C Screening 1950 DTaP/Tdap/Td series (1 - Tdap) 3/23/1973 BREAST CANCER SCRN MAMMOGRAM 3/23/2002 FOBT Q 1 YEAR AGE 50-75 3/23/2002 ZOSTER VACCINE AGE 60> 1/23/2012 GLAUCOMA SCREENING Q2Y 3/23/2017 Bone Densitometry (Dexa) Screening 3/23/2017 Pneumococcal 65+ High/Highest Risk (1 of 2 - PCV13) 3/23/2017 MEDICARE YEARLY EXAM 3/14/2018 Influenza Age 5 to Adult 8/1/2018 Allergies as of 5/15/2018  Review Complete On: 5/15/2018 By: Juan F Soliz NP Severity Noted Reaction Type Reactions Shellfish Derived  12/19/2016    Other (comments) Was told not to take it. Current Immunizations  Reviewed on 12/19/2016 No immunizations on file. Not reviewed this visit You Were Diagnosed With   
  
 Codes Comments [facility]    -  Primary ICD-10-CM: C54.1 ICD-9-CM: 182. 0 Vitals BP Pulse Temp Resp Height(growth percentile) Weight(growth percentile) 125/78 (BP 1 Location: Right arm, BP Patient Position: Sitting) 72 98 °F (36.7 °C) (Oral) 18 5' 5\" (1.651 m) 237 lb 9.6 oz (107.8 kg) SpO2 BMI OB Status Smoking Status 98% 39.54 kg/m2 Hysterectomy Former Smoker BMI and BSA Data Body Mass Index Body Surface Area  
 39.54 kg/m 2 2.22 m 2 Preferred Pharmacy Pharmacy Name Phone Pamela Julian, [facility] [phone] Your Updated Medication List  
  
   
 This list is accurate as of 5/15/18 11:07 AM.  Always use your most recent med list.  
  
  
  
  
 allopurinol 100 mg tablet Commonly known as:  Frankie Riedel Take 100 mg by mouth daily (after breakfast). ECOTRIN 325 mg tablet Generic drug:  aspirin delayed-release Take 325 mg by mouth daily. levothyroxine 100 mcg tablet Commonly known as:  SYNTHROID Take  by mouth Daily (before breakfast). losartan-hydroCHLOROthiazide 50-12.5 mg per tablet Commonly known as:  HYZAAR  
  
 meloxicam 7.5 mg tablet Commonly known as:  MOBIC Take  by mouth daily. MIRALAX 17 gram/dose powder Generic drug:  polyethylene glycol Take 17 g by mouth daily as needed for Other (constipation). pravastatin 20 mg tablet Commonly known as:  PRAVACHOL Take 20 mg by mouth nightly. traMADol 50 mg tablet Commonly known as:  ULTRAM  
Take 50 mg by mouth every six (6) hours as needed for Pain. VITAMIN D3 2,000 unit Tab Generic drug:  cholecalciferol (vitamin D3) Take  by mouth daily. Follow-up Instructions Return in about 4 months (around 9/15/2018). Patient Instructions Below is some information on the condition you previously were diagnosed with. Please call the office ASAP if you begin to experience the following symptoms: Persistent or worsening abdominal or pelvic pain, any unusual vaginal bleeding, discharge, odor, or irritation, and any changes in bladder or bowel habits such as constipation, diarrhea, burning, or general discomfort. Please call the office if you have any other questions or concerns. Uterine Cancer: Care Instructions Your Care Instructions Uterine cancer is the rapid growth of abnormal cells that line the uterus. It also is called endometrial cancer. These cells may spread to nearby organs, lymph glands, or distant organs. Uterine cancer can be cured most often when found early. Treatment may include surgery to remove the uterus, ovaries, fallopian tubes, and sometimes the pelvic lymph nodes. Radiation and hormones to stop cancer growth also are sometimes used. Chemotherapy may be used if the cancer has spread. Having cancer can be scary. You may feel many emotions and may need some help coping. Seek out family, friends, and counselors for support. You can do things at home to make yourself feel better while you go through treatment. You also can call the "Mobile Location, IP" (7-634.165.3224) or visit its website at [address] [employer] for more information. Follow-up care is a key part of your treatment and safety. Be sure to make and go to all appointments, and call your doctor if you are having problems. It's also a good idea to know your test results and keep a list of the medicines you take. How can you care for yourself at home? · Take your medicines exactly as prescribed. Call your doctor if you think you are having a problem with your medicine. You may get medicine for nausea and vomiting if you have these side effects. · Eat healthy food. If you do not feel like eating, try to eat food that has protein and extra calories to keep up your strength and prevent weight loss. Drink liquid meal replacements for extra calories and protein. Try to eat your main meal early. · Get some physical activity every day, but do not get too tired. Keep doing the hobbies you enjoy as your energy allows. · Take steps to control your stress and workload. Learn relaxation techniques. ¨ Share your feelings. Stress and tension affect our emotions. By expressing your feelings to others, you may be able to understand and cope with them. ¨ Consider joining a support group. Talking about a problem with your spouse, a good friend, or other people with similar problems is a good way to reduce tension and stress. ¨ Express yourself through art.  Try writing, dance, art, or crafts to relieve tension. Some dance, writing, or art groups may be available just for people who have cancer. ¨ Be kind to your body and mind. Getting enough sleep, eating a healthy diet, and taking time to do things you enjoy can contribute to an overall feeling of balance in your life and help reduce stress. ¨ Get help if you need it. Discuss your concerns with your doctor or counselor. · If you are vomiting or have diarrhea: ¨ Drink plenty of fluids (enough so that your urine is light yellow or clear like water) to prevent dehydration. Choose water and other caffeine-free clear liquids. If you have kidney, heart, or liver disease and have to limit fluids, talk with your doctor before you increase the amount of fluids you drink. ¨ When you are able to eat, try clear soups, mild foods, and liquids until all symptoms are gone for 12 to 48 hours. Other good choices include dry toast, crackers, cooked cereal, and gelatin dessert, such as Jell-O. 
· Take care of your urinary tract to prevent problems such as infection, which can be caused by uterine cancer and its treatment. Limit drinks with caffeine, drink plenty of fluids, and urinate every 3 to 4 hours. · If you have not already done so, prepare a list of advance directives. Advance directives are instructions to your doctor and family members about what kind of care you want if you become unable to speak or express yourself. When should you call for help? Call 911 anytime you think you may need emergency care. For example, call if: 
? · You passed out (lost consciousness). ?Call your doctor now or seek immediate medical care if: 
? · You have a fever. ? · You have abnormal bleeding. ? · You have new or worse pain. ? · You think you have an infection. ? · You have new symptoms, such as a cough, belly pain, vomiting, diarrhea, or a rash. ? Watch closely for changes in your health, and be sure to contact your doctor if: ? · You are much more tired than usual.  
? · You have swollen glands in your armpits, groin, or neck. ? · You do not get better as expected. Where can you learn more? Go to http://bryan-christin.info/. Enter E343 in the search box to learn more about \"Uterine Cancer: Care Instructions. \" Current as of: May 12, 2017 Content Version: 11.4 © 8073-1902 [employer]ician[employer]. Care instructions adapted under license by [employer] (which disclaims liability or warranty for this information). If you have questions about a medical condition or this instruction, always ask your healthcare professional. [address] any warranty or liability for your use of this information. Introducing [facility]! [facility] introduces [employer] [facility] [employer] patient portal. Now you can access parts of your medical record, email your doctor's office, and request medication refills online. 1. In your internet browser, go to [email or web address] 2. Click on the First Time User? Click Here link in the Sign In box. You will see the New Member Sign Up page. 3. Enter your [facility] [facility] [employer] Access Code exactly as it appears below. You will not need to use this code after youve completed the sign-up process. If you do not sign up before the expiration date, you must request a new code. · [employer] [facility] [employer] Access Code: [email or web address] Expires: 8/13/2018 11:07 AM 
 
4. Enter the last four digits of your Social Security Number (xxxx) and Date of Birth (mm/dd/yyyy) as indicated and click Submit. You will be taken to the next sign-up page. 5. Create a LocBox Labst ID. This will be your [facility] [employer] login ID and cannot be changed, so think of one that is secure and easy to remember. 6. Create a [employer] [facility] [employer] password. You can change your password at any time. 7. Enter your Password Reset Question and Answer. This can be used at a later time if you forget your password. 8. Enter your e-mail address. You will receive e-mail notification when new information is available in [address] 9. Click Sign Up. You can now view and download portions of your medical record. 10. Click the Download Summary menu link to download a portable copy of your medical information. If you have questions, please visit the Frequently Asked Questions section of the [employer] website. Remember, [employer] is NOT to be used for urgent needs. For medical emergencies, dial 911. Now available from your iPhone and Android! Please provide this summary of care documentation to your next provider. Your primary care clinician is listed as Sydney Jim. If you have any questions after today's visit, please call [phone].

## 2018-09-11 ENCOUNTER — TELEPHONE (OUTPATIENT)
Dept: ONCOLOGY | Age: 68
End: 2018-09-11

## 2018-09-13 ENCOUNTER — TELEPHONE (OUTPATIENT)
Dept: ONCOLOGY | Age: 68
End: 2018-09-13

## 2019-01-11 ENCOUNTER — OFFICE VISIT (OUTPATIENT)
Dept: ONCOLOGY | Age: 69
End: 2019-01-11

## 2019-01-11 VITALS
BODY MASS INDEX: 40.98 KG/M2 | WEIGHT: 246 LBS | HEIGHT: 65 IN | RESPIRATION RATE: 18 BRPM | HEART RATE: 66 BPM | TEMPERATURE: 97.5 F | SYSTOLIC BLOOD PRESSURE: 144 MMHG | OXYGEN SATURATION: 95 % | DIASTOLIC BLOOD PRESSURE: 80 MMHG

## 2019-01-11 DIAGNOSIS — C54.1 ENDOMETRIAL CANCER (HCC): Primary | ICD-10-CM

## 2019-01-11 PROBLEM — E66.01 OBESITY, MORBID (HCC): Status: ACTIVE | Noted: 2019-01-11

## 2019-01-11 NOTE — PROGRESS NOTES
[address], [address] [place], [address] [address] [phone] Manny Fothergill DO Patient ID: 
Name: Taunya Felty MRM: [id number] : 1950/68 y.o. Date: 2019 SUBJECTIVE: This is a 79 y.o.   female new diagnosis of Stage IAG2 endometrial cancer s/p TLH and BSO on 2016. Patient denies any Gyn symptoms. Patient specifically denies any abnormal vaginal bleeding, vaginal discharge, or vaginal irritation. Patient also denies abdominal pain, pelvic pain, or abdominal bloating. Patient is voiding without difficulty. Her pathology revealed: 16 SPECIMEN:  
Uterus, cervix, bilateral fallopian tubes, bilateral ovaries. PROCEDURE: SIMPLE HYSTERECTOMY, BILATERAL SALPINGO-OOPHORECTOMY. LYMPH NODE SAMPLING: NOT PERFORMED. SPECIMEN INTEGRITY: INTACT HYSTERECTOMY SPECIMEN. TUMOR SIZE: 4.4 X 4.0 X 1.5 CM. HISTOLOGIC TYPE: ENDOMETRIOID ADENOCARCINOMA. HISTOLOGIC GRADE: FIGO GRADE 2. MYOMETRIAL INVASION: NOT IDENTIFIED. TUMOR INVOLVEMENT OF CERVIX: NOT INVOLVED. EXTENT OF INVOLVEMENT OF OTHER ORGANS:  
Right Ovary: Not involved. Left Ovary: Not involved. Right Fallopian Tube: Not involved. Left Fallopian Tube: Not involved. Serosa: Not involved. PERITONEAL WASHINGS ([facility]): NO MALIGNANT CELLS FOUND. LYMPH/VASCULAR INVASION: NOT IDENTIFIED. PATHOLOGIC STAGING: pT1a, NX, MX.  
ANCILLARY STUDIES:  
Mismatch Repair IHC Panel: Pending. To be issued as an addendum. Estrogen Receptor (ER) Status: POSITIVE (95%). Progesterone Receptor (PgR) Status: POSITIVE (95%). Medications: 
  
Current Outpatient Medications on File Prior to Visit Medication Sig Dispense Refill  polyethylene glycol (MIRALAX) 17 gram/dose powder Take 17 g by mouth daily as needed for Other (constipation).  losartan-hydroCHLOROthiazide (HYZAAR) 50-12.5 mg per tablet  aspirin delayed-release (ECOTRIN) 325 mg tablet Take 325 mg by mouth daily.  cholecalciferol, vitamin D3, (VITAMIN D3) 2,000 unit tab Take  by mouth daily.  levothyroxine (SYNTHROID) 100 mcg tablet Take  by mouth Daily (before breakfast).  pravastatin (PRAVACHOL) 20 mg tablet Take 20 mg by mouth nightly.  allopurinol (ZYLOPRIM) 100 mg tablet Take 100 mg by mouth daily (after breakfast).  meloxicam (MOBIC) 7.5 mg tablet Take  by mouth daily.  traMADol (ULTRAM) 50 mg tablet Take 50 mg by mouth every six (6) hours as needed for Pain. No current facility-administered medications on file prior to visit. Allergies: Allergies Allergen Reactions  Shellfish Derived Other (comments) Was told not to take it. OBJECTIVE: 
 
Vitals:  
Visit Vitals /80 (BP 1 Location: Left arm, BP Patient Position: Sitting) Pulse 66 Temp 97.5 °F (36.4 °C) (Oral) Resp 18 Ht 5' 5\" (1.651 m) Wt 111.6 kg (246 lb) SpO2 95% BMI 40.94 kg/m² Physical Examination: 
 
General:  alert, cooperative, no distress Abdomen: soft, bowel sounds active, non-tender Incision: healing well Pelvic: NEFG, Spec exam revealed vaginal cuff intact, BME revealed vaginal cuff intact, nontender Rectal: not done Extremity:   extremities normal, atraumatic, no cyanosis or edema IMPRESSION/PLAN: 
 
 Stage IAG2 endometrial cancer, who is clinically MITESH 
 -reviewed her pathology and favorable prognosis with low risk of recurrence and no need for adjuvant treatment 
 -reviewed surveillance strategy including exams every 4 months x 2 years, then every 6 months x 3 years then annually 
 -discussed s/sx of recurrence 
 -all of patients questions and concerns addressed 
 -will plan for f/u in 4 months Total time spent was 25 minutes regarding diagnosis of endometrial cancer and >50% of this time was spent counseling and coordinating care.  
 
Adilene Zhao NP 
 Adam Whitten DO Gynecologic Oncology 1/11/2019/12:56 PM

## 2019-01-11 NOTE — PATIENT INSTRUCTIONS
Below you will find information on the condition you were previously diagnosed with. Please call the office as soon as possible if you begin to experience the following symptoms: Persistent or worsening abdominal or pelvic pain, any unusual vaginal bleeding, discharge, odor, or irritation, and any changes in bladder or bowel habits such as constipation, diarrhea, burning, or general discomfort. Please call the office if you have any other questions or concerns. Uterine Cancer: Care Instructions Your Care Instructions Uterine cancer is the rapid growth of abnormal cells that line the uterus. It also is called endometrial cancer. These cells may spread to nearby organs, lymph glands, or distant organs. Uterine cancer can be cured most often when found early. Treatment may include surgery to remove the uterus, ovaries, fallopian tubes, and sometimes the pelvic lymph nodes. Radiation and hormones to stop cancer growth also are sometimes used. Chemotherapy may be used if the cancer has spread. Having cancer can be scary. You may feel many emotions and may need some help coping. Seek out family, friends, and counselors for support. You can do things at home to make yourself feel better while you go through treatment. You also can call the [facility] (0-717.131.8455) or visit its website at [address] [employer] for more information. Follow-up care is a key part of your treatment and safety. Be sure to make and go to all appointments, and call your doctor if you are having problems. It's also a good idea to know your test results and keep a list of the medicines you take. How can you care for yourself at home? · Take your medicines exactly as prescribed. Call your doctor if you think you are having a problem with your medicine. You may get medicine for nausea and vomiting if you have these side effects. · Eat healthy food.  If you do not feel like eating, try to eat food that has protein and extra calories to keep up your strength and prevent weight loss. Drink liquid meal replacements for extra calories and protein. Try to eat your main meal early. · Get some physical activity every day, but do not get too tired. Keep doing the hobbies you enjoy as your energy allows. · Take steps to control your stress and workload. Learn relaxation techniques. ? Share your feelings. Stress and tension affect our emotions. By expressing your feelings to others, you may be able to understand and cope with them. ? Consider joining a support group. Talking about a problem with your spouse, a good friend, or other people with similar problems is a good way to reduce tension and stress. ? Express yourself through art. Try writing, dance, art, or crafts to relieve tension. Some dance, writing, or art groups may be available just for people who have cancer. ? Be kind to your body and mind. Getting enough sleep, eating a healthy diet, and taking time to do things you enjoy can contribute to an overall feeling of balance in your life and help reduce stress. ? Get help if you need it. Discuss your concerns with your doctor or counselor. · If you are vomiting or have diarrhea: ? Drink plenty of fluids (enough so that your urine is light yellow or clear like water) to prevent dehydration. Choose water and other caffeine-free clear liquids. If you have kidney, heart, or liver disease and have to limit fluids, talk with your doctor before you increase the amount of fluids you drink. ? When you are able to eat, try clear soups, mild foods, and liquids until all symptoms are gone for 12 to 48 hours. Other good choices include dry toast, crackers, cooked cereal, and gelatin dessert, such as Jell-O. 
· Take care of your urinary tract to prevent problems such as infection, which can be caused by uterine cancer and its treatment.  Limit drinks with caffeine, drink plenty of fluids, and urinate every 3 to 4 hours. · If you have not already done so, prepare a list of advance directives. Advance directives are instructions to your doctor and family members about what kind of care you want if you become unable to speak or express yourself. When should you call for help? Call 911 anytime you think you may need emergency care. For example, call if: 
  · You passed out (lost consciousness).  
 Call your doctor now or seek immediate medical care if: 
  · You have a fever.  
  · You have abnormal bleeding.  
  · You have new or worse pain.  
  · You think you have an infection.  
  · You have new symptoms, such as a cough, belly pain, vomiting, diarrhea, or a rash.  
 Watch closely for changes in your health, and be sure to contact your doctor if: 
  · You are much more tired than usual.  
  · You have swollen glands in your armpits, groin, or neck.  
  · You do not get better as expected. Where can you learn more? Go to http://bryan-christin.info/. Enter E343 in the search box to learn more about \"Uterine Cancer: Care Instructions. \" Current as of: March 28, 2018 Content Version: 11.8 © 1311-6203 [employer]. Care instructions adapted under license by [employer] (which disclaims liability or warranty for this information). If you have questions about a medical condition or this instruction, always ask your healthcare professional. [place] [address] any warranty or liability for your use of this information.

## 2019-01-11 NOTE — PROGRESS NOTES
Terri Michael, a 79 y.o. female,  is here for Chief Complaint Patient presents with  Uterine Cancer  
  surveillance Visit Vitals /80 (BP 1 Location: Left arm, BP Patient Position: Sitting) Pulse 66 Temp 97.5 °F (36.4 °C) (Oral) Resp 18 Ht 5' 5\" (1.651 m) Wt 111.6 kg (246 lb) SpO2 95% BMI 40.94 kg/m² Patient denies any persistent or worsening abdominal or pelvic pain. Denies any unusual vaginal bleeding, discharge, irritation, or odor. Denies experiencing any constipation or diarrhea. No burning, discomfort, or irritation with urination. 1. Have you been to the ER, urgent care clinic since your last visit? Hospitalized since your last visit? No 
 
2. Have you seen or consulted any other health care providers outside of the [address] [place] [address] since your last visit? Include any pap smears or colon screening.  No

## 2019-05-14 ENCOUNTER — OFFICE VISIT (OUTPATIENT)
Dept: ONCOLOGY | Age: 69
End: 2019-05-14

## 2019-05-14 VITALS
RESPIRATION RATE: 16 BRPM | BODY MASS INDEX: 40.15 KG/M2 | OXYGEN SATURATION: 96 % | WEIGHT: 241 LBS | SYSTOLIC BLOOD PRESSURE: 138 MMHG | DIASTOLIC BLOOD PRESSURE: 87 MMHG | HEIGHT: 65 IN | TEMPERATURE: 97.5 F | HEART RATE: 62 BPM

## 2019-05-14 DIAGNOSIS — C54.1 ENDOMETRIAL CANCER (HCC): Primary | ICD-10-CM

## 2019-05-14 NOTE — LETTER
5/14/19 Patient: Marck Ruff YOB: 1950 Date of Visit: 5/14/2019 Adam Morrison MD 
[address] [place] [address] VIA Facsimile: [phone] Dear Adam Morrison MD, Thank you for referring Ms. Terri Hughes to [facility] for evaluation. My notes for this consultation are attached. If you have questions, please do not hesitate to call me. I look forward to following your patient along with you. Sincerely, Farzaneh Mar MD

## 2019-05-14 NOTE — PROGRESS NOTES
Terri Chow, a 81 y.o. female,  is here for   Chief Complaint   Patient presents with    Uterine Cancer     4 month surveillance       Visit Vitals  BP (!) 145/116 (BP 1 Location: Left arm, BP Patient Position: Sitting)   Pulse 62   Temp 97.5 °F (36.4 °C) (Oral)   Resp 16   Ht 5' 5\" (1.651 m)   Wt 109.3 kg (241 lb)   SpO2 96%   BMI 40.10 kg/m²       Patient denies any persistent or worsening abdominal or pelvic pain. Denies any unusual vaginal bleeding, discharge, irritation, or odor. Denies experiencing any constipation or diarrhea. No burning, discomfort, or irritation with urination. Patient has not taken her Hyzaar this morning, \" I haven't eaten breakfast and I usually take it after breakfast.\" She is currently asymptomatic. Plans to take it once she gets back home. Patient was educated about getting her BP taken in her left arm since it is more elevated than the right. 1. Have you been to the ER, urgent care clinic since your last visit? Hospitalized since your last visit? No    2. Have you seen or consulted any other health care providers outside of the [address] [place] [address] since your last visit? Include any pap smears or colon screening.  No

## 2019-05-14 NOTE — PATIENT INSTRUCTIONS

## 2019-05-14 NOTE — PROGRESS NOTES
[facility]  [address] [place] [address], [address], [address] [facility]   [phone]  Nael Yakov       Patient ID:  Name: Rene Paz  MRM: [id number]  : 1950/69 y.o. Date: 2019    SUBJECTIVE:  This is a 81 y.o.   female new diagnosis of Stage IAG2 endometrial cancer s/p TLH and BSO on 2016. Patient denies any Gyn symptoms. Patient specifically denies any abnormal vaginal bleeding, vaginal discharge, or vaginal irritation. Patient also denies abdominal pain, pelvic pain, or abdominal bloating. Patient is voiding without difficulty. Her pathology revealed: 16  SPECIMEN:   Uterus, cervix, bilateral fallopian tubes, bilateral ovaries. PROCEDURE: SIMPLE HYSTERECTOMY, BILATERAL SALPINGO-OOPHORECTOMY. LYMPH NODE SAMPLING: NOT PERFORMED. SPECIMEN INTEGRITY: INTACT HYSTERECTOMY SPECIMEN. TUMOR SIZE: 4.4 X 4.0 X 1.5 CM. HISTOLOGIC TYPE: ENDOMETRIOID ADENOCARCINOMA. HISTOLOGIC GRADE: FIGO GRADE 2. MYOMETRIAL INVASION: NOT IDENTIFIED. TUMOR INVOLVEMENT OF CERVIX: NOT INVOLVED. EXTENT OF INVOLVEMENT OF OTHER ORGANS:   Right Ovary: Not involved. Left Ovary: Not involved. Right Fallopian Tube: Not involved. Left Fallopian Tube: Not involved. Serosa: Not involved. PERITONEAL WASHINGS (Lauren Parish): NO MALIGNANT CELLS FOUND. LYMPH/VASCULAR INVASION: NOT IDENTIFIED. PATHOLOGIC STAGING: pT1a, NX, MX.   ANCILLARY STUDIES:   Mismatch Repair IHC Panel: Pending. To be issued as an addendum. Estrogen Receptor (ER) Status: POSITIVE (95%). Progesterone Receptor (PgR) Status: POSITIVE (95%). Medications:     Current Outpatient Medications on File Prior to Visit   Medication Sig Dispense Refill    losartan-hydroCHLOROthiazide (HYZAAR) 50-12.5 mg per tablet       aspirin delayed-release (ECOTRIN) 325 mg tablet Take 325 mg by mouth daily.       cholecalciferol, vitamin D3, (VITAMIN D3) 2,000 unit tab Take  by mouth daily.  levothyroxine (SYNTHROID) 100 mcg tablet Take  by mouth Daily (before breakfast).  pravastatin (PRAVACHOL) 20 mg tablet Take 20 mg by mouth nightly.  allopurinol (ZYLOPRIM) 100 mg tablet Take 100 mg by mouth daily (after breakfast).  meloxicam (MOBIC) 7.5 mg tablet Take  by mouth daily.  polyethylene glycol (MIRALAX) 17 gram/dose powder Take 17 g by mouth daily as needed for Other (constipation).  traMADol (ULTRAM) 50 mg tablet Take 50 mg by mouth every six (6) hours as needed for Pain. No current facility-administered medications on file prior to visit. Allergies: Allergies   Allergen Reactions    Shellfish Derived Other (comments)     Was told not to take it.         OBJECTIVE:    Vitals:   Visit Vitals  /87 (BP 1 Location: Right arm, BP Patient Position: Sitting)   Pulse 62   Temp 97.5 °F (36.4 °C) (Oral)   Resp 16   Ht 5' 5\" (1.651 m)   Wt 109.3 kg (241 lb)   SpO2 96%   BMI 40.10 kg/m²       Physical Examination:    General:  alert, cooperative, no distress   Abdomen: soft, bowel sounds active, non-tender   Incision: healing well   Pelvic: NEFG, Spec exam revealed vaginal cuff intact, BME revealed vaginal cuff intact, nontender   Rectal: not done   Extremity:   extremities normal, atraumatic, no cyanosis or edema     IMPRESSION/PLAN:     Stage IAG2 endometrial cancer, who is clinically MITESH   -reviewed her pathology and favorable prognosis with low risk of recurrence and no need for adjuvant treatment   -reviewed surveillance strategy including exams every 4 months x 2 years, then every 6 months x 3 years then annually   -discussed s/sx of recurrence   -pelvic exam performed today   -all of patients questions and concerns addressed   -will plan for f/u in 4 months    Total time spent was 25 minutes regarding diagnosis of endometrial cancer and >50% of this time was spent counseling and coordinating care.      Nael Nagy DO  Gynecologic Oncology  5/14/2019/8:56 AM

## 2019-10-04 ENCOUNTER — TELEPHONE (OUTPATIENT)
Dept: ONCOLOGY | Age: 69
End: 2019-10-04

## 2019-10-24 NOTE — PROGRESS NOTES
[facility]  [address] [place] [address], [address], [address] [place]  [phone]  [facility]      Patient ID:  Name: Acosta Postal  MRM: [id number]  : 1950/69 y.o. Date: 10/25/2019    SUBJECTIVE:  This is a 81 y.o.   female new diagnosis of Stage IAG2 endometrial cancer s/p TLH and BSO on 2016. Patient denies any Gyn symptoms. Patient specifically denies any abnormal vaginal bleeding, vaginal discharge, or vaginal irritation. Patient also denies abdominal pain, pelvic pain, or abdominal bloating. Patient is voiding without difficulty. Her pathology revealed: 16  SPECIMEN:   Uterus, cervix, bilateral fallopian tubes, bilateral ovaries. PROCEDURE: SIMPLE HYSTERECTOMY, BILATERAL SALPINGO-OOPHORECTOMY. LYMPH NODE SAMPLING: NOT PERFORMED. SPECIMEN INTEGRITY: INTACT HYSTERECTOMY SPECIMEN. TUMOR SIZE: 4.4 X 4.0 X 1.5 CM. HISTOLOGIC TYPE: ENDOMETRIOID ADENOCARCINOMA. HISTOLOGIC GRADE: FIGO GRADE 2. MYOMETRIAL INVASION: NOT IDENTIFIED. TUMOR INVOLVEMENT OF CERVIX: NOT INVOLVED. EXTENT OF INVOLVEMENT OF OTHER ORGANS:   Right Ovary: Not involved. Left Ovary: Not involved. Right Fallopian Tube: Not involved. Left Fallopian Tube: Not involved. Serosa: Not involved. PERITONEAL WASHINGS (Collie Perkelly): NO MALIGNANT CELLS FOUND. LYMPH/VASCULAR INVASION: NOT IDENTIFIED. PATHOLOGIC STAGING: pT1a, NX, MX.   ANCILLARY STUDIES:   Mismatch Repair IHC Panel: Pending. To be issued as an addendum. Estrogen Receptor (ER) Status: POSITIVE (95%). Progesterone Receptor (PgR) Status: POSITIVE (95%). Medications:     Current Outpatient Medications on File Prior to Visit   Medication Sig Dispense Refill    losartan-hydroCHLOROthiazide (HYZAAR) 50-12.5 mg per tablet       aspirin delayed-release (ECOTRIN) 325 mg tablet Take 325 mg by mouth daily.       cholecalciferol, vitamin D3, (VITAMIN D3) 2,000 unit tab Take  by mouth daily.  levothyroxine (SYNTHROID) 100 mcg tablet Take  by mouth Daily (before breakfast).  pravastatin (PRAVACHOL) 20 mg tablet Take 20 mg by mouth nightly.  allopurinol (ZYLOPRIM) 100 mg tablet Take 100 mg by mouth daily (after breakfast).  meloxicam (MOBIC) 7.5 mg tablet Take  by mouth daily.  polyethylene glycol (MIRALAX) 17 gram/dose powder Take 17 g by mouth daily as needed for Other (constipation).  traMADol (ULTRAM) 50 mg tablet Take 50 mg by mouth every six (6) hours as needed for Pain. No current facility-administered medications on file prior to visit. Allergies: Allergies   Allergen Reactions    Shellfish Derived Other (comments)     Was told not to take it. OBJECTIVE:    Vitals:   Visit Vitals  /81   Pulse 82   Temp 98.4 °F (36.9 °C) (Oral)   Resp 12   Ht 5' 5\" (1.651 m)   Wt 110 kg (242 lb 9.6 oz)   SpO2 99%   BMI 40.37 kg/m²       Physical Examination:    General:  alert, cooperative, no distress   Abdomen: soft, bowel sounds active, non-tender   Incision: healing well   Pelvic: NEFG, Spec exam revealed vaginal cuff intact, BME revealed vaginal cuff intact, nontender   Rectal: not done   Extremity:   extremities normal, atraumatic, no cyanosis or edema     IMPRESSION/PLAN:     Stage IAG2 endometrial cancer, who is clinically MITESH   -reviewed her pathology and favorable prognosis with low risk of recurrence and no need for adjuvant treatment   -reviewed surveillance strategy including exams every 4 months x 2 years, then every 6 months x 3 years then annually   -discussed s/sx of recurrence   -pelvic exam performed today   -all of patients questions and concerns addressed   -will plan for f/u in 6 months    Total time spent was 25 minutes regarding diagnosis of endometrial cancer and >50% of this time was spent counseling and coordinating care.       Valdo Redmond DO  Gynecologic Oncology  10/25/2019/8:56 AM

## 2019-10-25 ENCOUNTER — OFFICE VISIT (OUTPATIENT)
Dept: ONCOLOGY | Age: 69
End: 2019-10-25

## 2019-10-25 VITALS
TEMPERATURE: 98.4 F | RESPIRATION RATE: 12 BRPM | WEIGHT: 242.6 LBS | HEIGHT: 65 IN | OXYGEN SATURATION: 99 % | DIASTOLIC BLOOD PRESSURE: 81 MMHG | BODY MASS INDEX: 40.42 KG/M2 | HEART RATE: 82 BPM | SYSTOLIC BLOOD PRESSURE: 124 MMHG

## 2019-10-25 DIAGNOSIS — C54.1 ENDOMETRIAL CANCER (HCC): Primary | ICD-10-CM

## 2019-10-25 NOTE — PROGRESS NOTES
ROOM # 2    Terri Porras presents today for   Chief Complaint   Patient presents with    Uterine Cancer     Visit Vitals  /81   Pulse 82   Temp 98.4 °F (36.9 °C) (Oral)   Resp 12   Ht 5' 5\" (1.651 m)   Wt 242 lb 9.6 oz (110 kg)   SpO2 99%   BMI 40.37 kg/m²     Do you have any unusual vaginal bleeding, discharge or irritation: no  Do you have any changes to your bowel movements: no  Have you been experiencing any N/V: no  Any persistent or worsened abdomen pain: no  Any urinary burning: no    Terri Porras preferred language for health care discussion is english/other. Is someone accompanying this pt? no    Is the patient using any DME equipment during OV? no    Depression Screening:  3 most recent PHQ Screens 1/11/2019 5/15/2018 1/2/2018   Little interest or pleasure in doing things Not at all Not at all Not at all   Feeling down, depressed, irritable, or hopeless Not at all Not at all Not at all   Total Score PHQ 2 0 0 0       Learning Assessment:  Learning Assessment 1/2/2018 12/13/2016   PRIMARY LEARNER Patient Patient   HIGHEST LEVEL OF EDUCATION - PRIMARY LEARNER  GRADUATED HIGH SCHOOL OR GED -   BARRIERS PRIMARY LEARNER NONE -   CO-LEARNER CAREGIVER No -   PRIMARY LANGUAGE ENGLISH ENGLISH   LEARNER PREFERENCE PRIMARY LISTENING OTHER (COMMENT)   ANSWERED BY self Patient   RELATIONSHIP SELF SELF       Abuse Screening:  Abuse Screening Questionnaire 1/11/2019 1/2/2018   Do you ever feel afraid of your partner? N N   Are you in a relationship with someone who physically or mentally threatens you? N N   Is it safe for you to go home? Y Y       Fall Risk  Fall Risk Assessment, last 12 mths 1/11/2019 1/2/2018   Able to walk? Yes Yes   Fall in past 12 months? No No       Health Maintenance reviewed and discussed per provider.  Yes    Terri Porras is due for   Health Maintenance Due   Topic Date Due    Hepatitis C Screening  1950    DTaP/Tdap/Td series (1 - Tdap) 03/23/1973    Shingrix Vaccine Age 50> (1 of 2) 03/23/2002    BREAST CANCER SCRN MAMMOGRAM  03/23/2002    FOBT Q 1 YEAR AGE 50-75  03/23/2002    GLAUCOMA SCREENING Q2Y  03/23/2017    Bone Densitometry (Dexa) Screening  03/23/2017    Pneumococcal 65+ years (1 of 2 - PCV13) 03/23/2017    MEDICARE YEARLY EXAM  03/14/2018    Influenza Age 9 to Adult  08/01/2019         Please order/place referral if appropriate. Advance Directive:  1. Do you have an advance directive in place? Patient Reply: no    2. If not, would you like material regarding how to put one in place? Patient Reply: no    Coordination of Care:  1. Have you been to the ER, urgent care clinic since your last visit? Hospitalized since your last visit? no    2. Have you seen or consulted any other health care providers outside of the [address] [place] [address] since your last visit? Include any pap smears or colon screening.  no

## 2019-10-25 NOTE — LETTER
10/25/19 Patient: Lakeisha Pérez YOB: 1950 Date of Visit: 10/25/2019 Giovanny Cash MD 
[facility]ral[facility] Murphy [address] [place] [address] VIA Facsimile: [phone] Dear Giovanny Cash MD, Thank you for referring Ms. Terri Hughes to [facility] for evaluation. My notes for this consultation are attached. If you have questions, please do not hesitate to call me. I look forward to following your patient along with you. Sincerely, Juanjo Leonard MD

## 2019-10-25 NOTE — PATIENT INSTRUCTIONS
Uterine Cancer: Care Instructions  Your Care Instructions  Uterine cancer is the rapid growth of abnormal cells that line the uterus. It also is called endometrial cancer. These cells may spread to nearby organs, lymph glands, or distant organs. Uterine cancer can be cured most often when found early. Treatment may include surgery to remove the uterus, ovaries, fallopian tubes, and sometimes the pelvic lymph nodes. Radiation and hormones to stop cancer growth also are sometimes used. Chemotherapy may be used if the cancer has spread. Having cancer can be scary. You may feel many emotions and may need some help coping. Seek out family, friends, and counselors for support. You can do things at home to make yourself feel better while you go through treatment. You also can call the [employer] (0-155.536.6630) or visit its website at [address] for more information. Follow-up care is a key part of your treatment and safety. Be sure to make and go to all appointments, and call your doctor if you are having problems. It's also a good idea to know your test results and keep a list of the medicines you take. How can you care for yourself at home? · Take your medicines exactly as prescribed. Call your doctor if you think you are having a problem with your medicine. You may get medicine for nausea and vomiting if you have these side effects. · Eat healthy food. If you do not feel like eating, try to eat food that has protein and extra calories to keep up your strength and prevent weight loss. Drink liquid meal replacements for extra calories and protein. Try to eat your main meal early. · Get some physical activity every day, but do not get too tired. Keep doing the hobbies you enjoy as your energy allows. · Take steps to control your stress and workload. Learn relaxation techniques. ? Share your feelings. Stress and tension affect our emotions.  By expressing your feelings to others, you may be able to understand and cope with them. ? Consider joining a support group. Talking about a problem with your spouse, a good friend, or other people with similar problems is a good way to reduce tension and stress. ? Express yourself through art. Try writing, dance, art, or crafts to relieve tension. Some dance, writing, or art groups may be available just for people who have cancer. ? Be kind to your body and mind. Getting enough sleep, eating a healthy diet, and taking time to do things you enjoy can contribute to an overall feeling of balance in your life and help reduce stress. ? Get help if you need it. Discuss your concerns with your doctor or counselor. · If you are vomiting or have diarrhea:  ? Drink plenty of fluids (enough so that your urine is light yellow or clear like water) to prevent dehydration. Choose water and other caffeine-free clear liquids. If you have kidney, heart, or liver disease and have to limit fluids, talk with your doctor before you increase the amount of fluids you drink. ? When you are able to eat, try clear soups, mild foods, and liquids until all symptoms are gone for 12 to 48 hours. Other good choices include dry toast, crackers, cooked cereal, and gelatin dessert, such as Jell-O.  · Take care of your urinary tract to prevent problems such as infection, which can be caused by uterine cancer and its treatment. Limit drinks with caffeine, drink plenty of fluids, and urinate every 3 to 4 hours. · If you have not already done so, prepare a list of advance directives. Advance directives are instructions to your doctor and family members about what kind of care you want if you become unable to speak or express yourself. When should you call for help? Call 911 anytime you think you may need emergency care.  For example, call if:    · You passed out (lost consciousness).    Call your doctor now or seek immediate medical care if:    · You have a fever.     · You have abnormal bleeding.     · You have new or worse pain.     · You think you have an infection.     · You have new symptoms, such as a cough, belly pain, vomiting, diarrhea, or a rash.    Watch closely for changes in your health, and be sure to contact your doctor if:    · You are much more tired than usual.     · You have swollen glands in your armpits, groin, or neck.     · You do not get better as expected. Where can you learn more? Go to http://bryan-christin.info/. Enter E343 in the search box to learn more about \"Uterine Cancer: Care Instructions. \"  Current as of: December 19, 2018  Content Version: 12.2  © 9261-6671 [employer]. Care instructions adapted under license by [employer] (which disclaims liability or warranty for this information). If you have questions about a medical condition or this instruction, always ask your healthcare professional. [address] any warranty or liability for your use of this information.

## 2020-04-02 ENCOUNTER — TELEPHONE (OUTPATIENT)
Dept: ONCOLOGY | Age: 70
End: 2020-04-02

## 2020-07-10 ENCOUNTER — OFFICE VISIT (OUTPATIENT)
Dept: ONCOLOGY | Age: 70
End: 2020-07-10

## 2020-07-10 VITALS
WEIGHT: 233 LBS | HEIGHT: 65 IN | TEMPERATURE: 98.7 F | BODY MASS INDEX: 38.82 KG/M2 | OXYGEN SATURATION: 96 % | HEART RATE: 77 BPM | SYSTOLIC BLOOD PRESSURE: 145 MMHG | DIASTOLIC BLOOD PRESSURE: 95 MMHG | RESPIRATION RATE: 14 BRPM

## 2020-07-10 DIAGNOSIS — C54.1 ENDOMETRIAL CANCER (HCC): Primary | ICD-10-CM

## 2020-07-10 DIAGNOSIS — B37.31 VULVOVAGINAL CANDIDIASIS: ICD-10-CM

## 2020-07-10 RX ORDER — BISMUTH SUBSALICYLATE 262 MG
1 TABLET,CHEWABLE ORAL DAILY
COMMUNITY

## 2020-07-10 RX ORDER — ASCORBIC ACID 500 MG
TABLET ORAL
COMMUNITY

## 2020-07-10 RX ORDER — FLUCONAZOLE 150 MG/1
150 TABLET ORAL DAILY
Qty: 1 TAB | Refills: 0 | Status: SHIPPED | OUTPATIENT
Start: 2020-07-10 | End: 2020-07-11

## 2020-07-10 NOTE — PROGRESS NOTES
[facility]  [address] [place] [address], [address], [address] [facility]   [phone]  Chantel Hills DO      Patient ID:  Name: Linda Almeida  MRM: [id number]  : 1950/70 y.o. Date: 7/10/2020    SUBJECTIVE:  This is a 78 y.o.   female new diagnosis of Stage IAG2 endometrial cancer s/p TLH and BSO on 2016. Patient denies any Gyn symptoms. Patient specifically denies any abnormal vaginal bleeding, vaginal discharge, or vaginal irritation. Patient also denies abdominal pain, pelvic pain, or abdominal bloating. Patient is voiding without difficulty. Her pathology revealed: 16  SPECIMEN:   Uterus, cervix, bilateral fallopian tubes, bilateral ovaries. PROCEDURE: SIMPLE HYSTERECTOMY, BILATERAL SALPINGO-OOPHORECTOMY. LYMPH NODE SAMPLING: NOT PERFORMED. SPECIMEN INTEGRITY: INTACT HYSTERECTOMY SPECIMEN. TUMOR SIZE: 4.4 X 4.0 X 1.5 CM. HISTOLOGIC TYPE: ENDOMETRIOID ADENOCARCINOMA. HISTOLOGIC GRADE: FIGO GRADE 2. MYOMETRIAL INVASION: NOT IDENTIFIED. TUMOR INVOLVEMENT OF CERVIX: NOT INVOLVED. EXTENT OF INVOLVEMENT OF OTHER ORGANS:   Right Ovary: Not involved. Left Ovary: Not involved. Right Fallopian Tube: Not involved. Left Fallopian Tube: Not involved. Serosa: Not involved. PERITONEAL WASHINGS (Destini Karen): NO MALIGNANT CELLS FOUND. LYMPH/VASCULAR INVASION: NOT IDENTIFIED. PATHOLOGIC STAGING: pT1a, NX, MX.   ANCILLARY STUDIES:   Mismatch Repair IHC Panel: Pending. To be issued as an addendum. Estrogen Receptor (ER) Status: POSITIVE (95%). Progesterone Receptor (PgR) Status: POSITIVE (95%). Medications:     Current Outpatient Medications on File Prior to Visit   Medication Sig Dispense Refill    ascorbic acid, vitamin C, (Vitamin C) 500 mg tablet Take  by mouth.  multivitamin (ONE A DAY) tablet Take 1 Tab by mouth daily.       losartan-hydroCHLOROthiazide (HYZAAR) 50-12.5 mg per tablet       aspirin delayed-release (ECOTRIN) 325 mg tablet Take 325 mg by mouth daily.  cholecalciferol, vitamin D3, (VITAMIN D3) 2,000 unit tab Take  by mouth daily.  levothyroxine (SYNTHROID) 100 mcg tablet Take  by mouth Daily (before breakfast).  pravastatin (PRAVACHOL) 20 mg tablet Take 20 mg by mouth nightly.  allopurinol (ZYLOPRIM) 100 mg tablet Take 100 mg by mouth daily (after breakfast).  meloxicam (MOBIC) 7.5 mg tablet Take  by mouth daily.  polyethylene glycol (MIRALAX) 17 gram/dose powder Take 17 g by mouth daily as needed for Other (constipation).  traMADol (ULTRAM) 50 mg tablet Take 50 mg by mouth every six (6) hours as needed for Pain. No current facility-administered medications on file prior to visit. Allergies: Allergies   Allergen Reactions    Shellfish Derived Other (comments)     Was told not to take it.         OBJECTIVE:    Vitals:   Visit Vitals  BP (!) 145/95 (BP 1 Location: Left arm, BP Patient Position: Sitting)   Pulse 77   Temp 98.7 °F (37.1 °C) (Oral)   Resp 14   Ht 5' 5\" (1.651 m)   Wt 105.7 kg (233 lb)   SpO2 96%   BMI 38.77 kg/m²       Physical Examination:    General:  alert, cooperative, no distress   Abdomen: soft, bowel sounds active, non-tender   Incision: Well healed   Pelvic: NEFG, Spec exam revealed vaginal cuff intact, white curd-like discharge, no lesions BME revealed vaginal cuff intact, nontender   Rectal: not done   Extremity:   extremities normal, atraumatic, no cyanosis or edema     IMPRESSION/PLAN:     Stage IAG2 endometrial cancer, who is clinically MITESH   -reviewed her pathology and favorable prognosis with low risk of recurrence and no need for adjuvant treatment   -reviewed surveillance strategy including exams every 4 months x 2 years, then every 6 months x 3 years then annually   -discussed s/sx of recurrence   -pelvic exam performed today   -all of patients questions and concerns addressed   -will plan for f/u in 6 months    Total time spent was 25 minutes regarding diagnosis of endometrial cancer and >50% of this time was spent counseling and coordinating care.       Evon GONZALEZ-[address]  Gynecologic Oncology  7/10/2020/10:36 AM

## 2020-07-10 NOTE — PROGRESS NOTES
Terri Lacy is a 78 y.o. female presents in office for endometrial cancer surveillance. Chief Complaint   Patient presents with    Uterine Cancer        Do you have any unusual vaginal bleeding, discharge or irritation? No  Do you have any changes in your bowel movements? No  Have you been experiencing nausea or vomiting? No  Have you been experiencing any continuous or worsening abdominal pain? No  Any urinary burning? No    Visit Vitals  BP (!) 145/95 (BP 1 Location: Left arm, BP Patient Position: Sitting)   Pulse 77   Temp 98.7 °F (37.1 °C) (Oral)   Resp 14   Ht 5' 5\" (1.651 m)   Wt 105.7 kg (233 lb)   SpO2 96%   BMI 38.77 kg/m²         Health Maintenance Due   Topic Date Due    Hepatitis C Screening  1950    Lipid Screen  03/23/1962    DTaP/Tdap/Td series (1 - Tdap) 03/23/1973    Shingrix Vaccine Age 50> (1 of 2) 03/23/2002    Breast Cancer Screen Mammogram  03/23/2002    FOBT Q1Y Age 50-75  03/23/2002    GLAUCOMA SCREENING Q2Y  03/23/2017    Bone Densitometry (Dexa) Screening  03/23/2017    Pneumococcal 65+ years (1 of 1 - PPSV23) 03/23/2017    Medicare Yearly Exam  03/14/2018         1. Have you been to the ER, urgent care clinic since your last visit? Hospitalized since your last visit? No    2. Have you seen or consulted any other health care providers outside of the [address] [place] [address] since your last visit? Include any pap smears or colon screening. No    3 most recent PHQ Screens 7/10/2020   Little interest or pleasure in doing things Not at all   Feeling down, depressed, irritable, or hopeless Not at all   Total Score PHQ 2 0       Abuse Screening Questionnaire 1/11/2019   Do you ever feel afraid of your partner? N   Are you in a relationship with someone who physically or mentally threatens you? N   Is it safe for you to go home? Y       Fall Risk Assessment, last 12 mths 7/10/2020   Able to walk? Yes   Fall in past 12 months?  No       Learning Assessment 1/2/2018 PRIMARY LEARNER Patient   HIGHEST LEVEL OF EDUCATION - PRIMARY LEARNER  GRADUATED HIGH SCHOOL OR GED   BARRIERS PRIMARY LEARNER NONE   CO-LEARNER CAREGIVER No   PRIMARY LANGUAGE ENGLISH   LEARNER PREFERENCE PRIMARY LISTENING   ANSWERED BY self   RELATIONSHIP SELF

## 2020-07-10 NOTE — LETTER
7/10/20 Patient: Stephanie James YOB: 1950 Date of Visit: 7/10/2020 Saumya Wilson MD 
Oralia Murphy [address] [place] [address] VIA Facsimile: [phone] Dear Saumya Wilson MD, Thank you for referring Ms. Terri Hughes to [facility] for evaluation. My notes for this consultation are attached. If you have questions, please do not hesitate to call me. I look forward to following your patient along with you. Sincerely, Erin Ribeiro MD

## 2022-03-18 PROBLEM — E66.01 OBESITY, MORBID (HCC): Status: ACTIVE | Noted: 2019-01-11

## 2022-03-19 PROBLEM — C54.1 ENDOMETRIAL CANCER (HCC): Status: ACTIVE | Noted: 2017-01-17

## 2024-02-29 ENCOUNTER — HOSPITAL ENCOUNTER (OUTPATIENT)
Facility: HOSPITAL | Age: 74
End: 2024-02-29
Payer: MEDICARE

## 2024-02-29 ENCOUNTER — HOSPITAL ENCOUNTER (OUTPATIENT)
Facility: HOSPITAL | Age: 74
Discharge: HOME | End: 2024-02-29
Payer: MEDICARE

## 2024-02-29 DIAGNOSIS — Z01.818 PRE-OP TESTING: Primary | ICD-10-CM

## 2024-02-29 LAB
ALBUMIN SERPL-MCNC: 3.9 G/DL (ref 3.4–5)
ALBUMIN/GLOB SERPL: 1 (ref 0.8–1.7)
ALP SERPL-CCNC: 80 U/L (ref 45–117)
ALT SERPL-CCNC: 9 U/L (ref 13–56)
ANION GAP SERPL CALC-SCNC: 3 MMOL/L (ref 3–18)
APPEARANCE UR: NORMAL
APTT PPP: 26.7 SEC (ref 23–36.4)
AST SERPL-CCNC: 11 U/L (ref 10–38)
BILIRUB SERPL-MCNC: 0.4 MG/DL (ref 0.2–1)
BILIRUB UR QL: NEGATIVE
BUN SERPL-MCNC: 18 MG/DL (ref 7–18)
BUN/CREAT SERPL: 19 (ref 12–20)
CALCIUM SERPL-MCNC: 9.6 MG/DL (ref 8.5–10.1)
CHLORIDE SERPL-SCNC: 108 MMOL/L (ref 100–111)
CO2 SERPL-SCNC: 29 MMOL/L (ref 21–32)
COLOR UR: YELLOW
CREAT SERPL-MCNC: 0.96 MG/DL (ref 0.6–1.3)
ERYTHROCYTE [DISTWIDTH] IN BLOOD BY AUTOMATED COUNT: 15.6 % (ref 11.6–14.5)
ERYTHROCYTE [SEDIMENTATION RATE] IN BLOOD: 83 MM/HR (ref 0–30)
GLOBULIN SER CALC-MCNC: 3.9 G/DL (ref 2–4)
GLUCOSE SERPL-MCNC: 99 MG/DL (ref 74–99)
GLUCOSE UR STRIP.AUTO-MCNC: NEGATIVE MG/DL
HCT VFR BLD AUTO: 39.8 % (ref 35–45)
HGB BLD-MCNC: 11.9 G/DL (ref 12–16)
HGB UR QL STRIP: NEGATIVE
INR PPP: 1 (ref 0.9–1.1)
KETONES UR QL STRIP.AUTO: NEGATIVE MG/DL
LEUKOCYTE ESTERASE UR QL STRIP.AUTO: NEGATIVE
MCH RBC QN AUTO: 23.6 PG (ref 24–34)
MCHC RBC AUTO-ENTMCNC: 29.9 G/DL (ref 31–37)
MCV RBC AUTO: 78.8 FL (ref 78–100)
NITRITE UR QL STRIP.AUTO: NEGATIVE
NRBC # BLD: 0 K/UL (ref 0–0.01)
NRBC BLD-RTO: 0 PER 100 WBC
PH UR STRIP: 5.5 (ref 5–8)
PLATELET # BLD AUTO: 242 K/UL (ref 135–420)
PMV BLD AUTO: 11 FL (ref 9.2–11.8)
POTASSIUM SERPL-SCNC: 4.8 MMOL/L (ref 3.5–5.5)
PROT SERPL-MCNC: 7.8 G/DL (ref 6.4–8.2)
PROT UR STRIP-MCNC: NEGATIVE MG/DL
PROTHROMBIN TIME: 13.1 SEC (ref 11.9–14.7)
RBC # BLD AUTO: 5.05 M/UL (ref 4.2–5.3)
SODIUM SERPL-SCNC: 140 MMOL/L (ref 136–145)
SP GR UR REFRACTOMETRY: 1.02 (ref 1–1.03)
UROBILINOGEN UR QL STRIP.AUTO: 0.2 EU/DL (ref 0.2–1)
WBC # BLD AUTO: 7.6 K/UL (ref 4.6–13.2)

## 2024-02-29 PROCEDURE — 87086 URINE CULTURE/COLONY COUNT: CPT

## 2024-02-29 PROCEDURE — 81003 URINALYSIS AUTO W/O SCOPE: CPT

## 2024-02-29 PROCEDURE — 93005 ELECTROCARDIOGRAM TRACING: CPT | Performed by: ORTHOPAEDIC SURGERY

## 2024-02-29 PROCEDURE — 85652 RBC SED RATE AUTOMATED: CPT

## 2024-02-29 PROCEDURE — 85730 THROMBOPLASTIN TIME PARTIAL: CPT

## 2024-02-29 PROCEDURE — 80053 COMPREHEN METABOLIC PANEL: CPT

## 2024-02-29 PROCEDURE — 85027 COMPLETE CBC AUTOMATED: CPT

## 2024-02-29 PROCEDURE — 85610 PROTHROMBIN TIME: CPT

## 2024-03-01 LAB
BACTERIA SPEC CULT: NORMAL
SERVICE CMNT-IMP: NORMAL
SERVICE CMNT-IMP: NORMAL

## 2024-03-02 LAB
EKG ATRIAL RATE: 64 BPM
EKG DIAGNOSIS: NORMAL
EKG P AXIS: 61 DEGREES
EKG P-R INTERVAL: 164 MS
EKG Q-T INTERVAL: 424 MS
EKG QRS DURATION: 76 MS
EKG QTC CALCULATION (BAZETT): 437 MS
EKG R AXIS: 48 DEGREES
EKG T AXIS: 57 DEGREES
EKG VENTRICULAR RATE: 64 BPM

## 2024-03-12 PROBLEM — M16.11 OSTEOARTHRITIS OF RIGHT HIP: Status: ACTIVE | Noted: 2024-03-12

## 2024-03-12 NOTE — DISCHARGE INSTRUCTIONS
Dr. Farley’s Post Operative Instructions Total Hip Replacement    ACTIVITIES :  1. You may be up and walking about the house with your walker.  2.  Activities around the house, such as washing dishes, fixing light meals, and your own personal care are fine.   3.  Avoid strenuous activities, such as vacuuming, lifting laundry or grocery bags.   4.  Walking is the best way to rebuild strength and stamina. Start SLOWLY and gradually increase your distance.  5.  Avoid any jogging, running or excessive stair-climbing   6.  Your home physical therapist will work with you for the first 7-14 days.    7.  Follow-up with Dr. Farley in 14 days.    BATHING and INCISION CARE:  1.  Do not remove bandage until follow up visit in office.  2. The incision may be tender to touch or feel numb: this is normal.   3.  Keep the incision clean and dry “no showering” until your follow-up appointment. The incision will be closed with sutures under the skin and the skin will be glued.   4.  Do not apply any lotions, ointments or oils on the incision.   5.  If you notice any excessive swelling, redness, or persistent drainage around the incision, notify the office immediately.    DRIVIN.  You should not drive until after your follow-up appointment.   2.  You can be in a vehicle for short distances, but if you travel any long distance, please stop about every 30 minutes and walk/stretch.   3.  You should NEVER drive while taking narcotic medication.    RETURN TO WORK :  1. The decision to return to work will be determined on an individual basis.   2.  Many people who have a strenuous job (construction, heavy labor, etc) may need to be off work for up to 12 weeks.   3.  If you need a work note, please let us know as soon as possible, and not the same day you are planning to return to work.    NUTRITION :  1.  Good nutrition is an essential part of healing.   2.  You should eat a balanced diet each

## 2024-03-18 PROBLEM — E07.9 THYROID DISEASE: Status: ACTIVE | Noted: 2024-03-18

## 2024-03-18 PROBLEM — I10 ESSENTIAL HYPERTENSION: Status: ACTIVE | Noted: 2024-03-18

## 2024-03-18 NOTE — H&P
Patient Name:  CONRAD MIRANDA    YOB: 1950      Chief Complaint:  Bilateral knee pain, right hip pain.    History of Chief Complaint:  Ms Miranda comes in for lower extremity complaints, difficulty with range of motion and weightbearing symptoms in the hips and lower extremities.  She is sent to me for the possibility of moving forward with evaluation and treatment for his hip and knee symptoms with chronic knee pain.    Past Medical/Surgical History:    Disease/Disorder Date Side Surgery Date Side Comment   Gout         Hypercholesterolemia         Hypertension         Thyroid disease           Allergies:  No known allergies.  Ingredient Reaction Medication Name Comment   NO KNOWN ALLERGIES          Current Medications:    Medication Directions   allopurinol 100 mg tablet    amlodipine 5 mg tablet TAKE 1 TABLET BY MOUTH ONCE DAILY   carvedilol 12.5 mg tablet TAKE 1 TABLET BY MOUTH TWICE DAILY   colchicine 0.6 mg tablet take 1 tablet by oral route  every day as needed   levothyroxine 137 mcg tablet TAKE 1 TABLET BY MOUTH ONCE DAILY   meloxicam 7.5 mg tablet TAKE 1 TABLET BY MOUTH ONCE DAILY WITH FOOD AS NEEDED FOR PAIN   potassium chloride ER 10 mEq tablet,extended release(part/cryst) take 1 tablet by oral route  every day with food   pravastatin 20 mg tablet TAKE 1 TABLET BY MOUTH ONCE DAILY   Vitamin D3 2,000 unit capsule take 1 capsule by oral route  every day     Social History:    SMOKING  Status Tobacco Type Units Per Day Yrs Used   Former smoker Cigarette 0.50 45.00     ALCOHOL  There is no history of alcohol use.     Family History:    Disease Detail Family Member Age Cause of Death Comments   Diabetes mellitus Mother  N      Review of Systems:    GENERAL:  Patient has no signs of chills or weight change. or fever  HEAD/ENTM:  Patient has no signs of headaches, dizziness, hearing loss, sore throat, recent cold, double vision, blurred vision, itchy eyes, eye redness or eye

## 2024-03-21 ENCOUNTER — APPOINTMENT (OUTPATIENT)
Facility: HOSPITAL | Age: 74
End: 2024-03-21
Attending: ORTHOPAEDIC SURGERY
Payer: MEDICARE

## 2024-03-21 ENCOUNTER — HOSPITAL ENCOUNTER (OUTPATIENT)
Facility: HOSPITAL | Age: 74
Setting detail: OUTPATIENT SURGERY
Discharge: HOME HEALTH | End: 2024-03-21
Attending: ORTHOPAEDIC SURGERY | Admitting: ORTHOPAEDIC SURGERY
Payer: MEDICARE

## 2024-03-21 ENCOUNTER — ANESTHESIA (OUTPATIENT)
Facility: HOSPITAL | Age: 74
End: 2024-03-21
Payer: MEDICARE

## 2024-03-21 ENCOUNTER — ANESTHESIA EVENT (OUTPATIENT)
Facility: HOSPITAL | Age: 74
End: 2024-03-21
Payer: MEDICARE

## 2024-03-21 VITALS
TEMPERATURE: 98.3 F | DIASTOLIC BLOOD PRESSURE: 82 MMHG | RESPIRATION RATE: 16 BRPM | SYSTOLIC BLOOD PRESSURE: 154 MMHG | WEIGHT: 241 LBS | HEIGHT: 64 IN | BODY MASS INDEX: 41.15 KG/M2 | OXYGEN SATURATION: 96 % | HEART RATE: 59 BPM

## 2024-03-21 DIAGNOSIS — Z96.641 S/P HIP REPLACEMENT, RIGHT: Primary | ICD-10-CM

## 2024-03-21 LAB
ABO + RH BLD: NORMAL
BLOOD GROUP ANTIBODIES SERPL: NORMAL
SPECIMEN EXP DATE BLD: NORMAL

## 2024-03-21 PROCEDURE — C1713 ANCHOR/SCREW BN/BN,TIS/BN: HCPCS | Performed by: ORTHOPAEDIC SURGERY

## 2024-03-21 PROCEDURE — A4217 STERILE WATER/SALINE, 500 ML: HCPCS | Performed by: ORTHOPAEDIC SURGERY

## 2024-03-21 PROCEDURE — 3700000001 HC ADD 15 MINUTES (ANESTHESIA): Performed by: ORTHOPAEDIC SURGERY

## 2024-03-21 PROCEDURE — 7100000010 HC PHASE II RECOVERY - FIRST 15 MIN: Performed by: ORTHOPAEDIC SURGERY

## 2024-03-21 PROCEDURE — C9290 INJ, BUPIVACAINE LIPOSOME: HCPCS | Performed by: ORTHOPAEDIC SURGERY

## 2024-03-21 PROCEDURE — 7100000000 HC PACU RECOVERY - FIRST 15 MIN: Performed by: ORTHOPAEDIC SURGERY

## 2024-03-21 PROCEDURE — 2580000003 HC RX 258: Performed by: ORTHOPAEDIC SURGERY

## 2024-03-21 PROCEDURE — 6360000002 HC RX W HCPCS: Performed by: ORTHOPAEDIC SURGERY

## 2024-03-21 PROCEDURE — 3600000012 HC SURGERY LEVEL 2 ADDTL 15MIN: Performed by: ORTHOPAEDIC SURGERY

## 2024-03-21 PROCEDURE — 6360000002 HC RX W HCPCS: Performed by: ANESTHESIOLOGY

## 2024-03-21 PROCEDURE — 3700000000 HC ANESTHESIA ATTENDED CARE: Performed by: ORTHOPAEDIC SURGERY

## 2024-03-21 PROCEDURE — 97116 GAIT TRAINING THERAPY: CPT

## 2024-03-21 PROCEDURE — 6360000002 HC RX W HCPCS: Performed by: REGISTERED NURSE

## 2024-03-21 PROCEDURE — 86900 BLOOD TYPING SEROLOGIC ABO: CPT

## 2024-03-21 PROCEDURE — 2500000003 HC RX 250 WO HCPCS: Performed by: REGISTERED NURSE

## 2024-03-21 PROCEDURE — 6370000000 HC RX 637 (ALT 250 FOR IP): Performed by: ANESTHESIOLOGY

## 2024-03-21 PROCEDURE — 2500000003 HC RX 250 WO HCPCS: Performed by: ORTHOPAEDIC SURGERY

## 2024-03-21 PROCEDURE — 97161 PT EVAL LOW COMPLEX 20 MIN: CPT

## 2024-03-21 PROCEDURE — 7100000001 HC PACU RECOVERY - ADDTL 15 MIN: Performed by: ORTHOPAEDIC SURGERY

## 2024-03-21 PROCEDURE — 36415 COLL VENOUS BLD VENIPUNCTURE: CPT

## 2024-03-21 PROCEDURE — C1776 JOINT DEVICE (IMPLANTABLE): HCPCS | Performed by: ORTHOPAEDIC SURGERY

## 2024-03-21 PROCEDURE — 97535 SELF CARE MNGMENT TRAINING: CPT

## 2024-03-21 PROCEDURE — 86850 RBC ANTIBODY SCREEN: CPT

## 2024-03-21 PROCEDURE — 6370000000 HC RX 637 (ALT 250 FOR IP): Performed by: ORTHOPAEDIC SURGERY

## 2024-03-21 PROCEDURE — 2709999900 HC NON-CHARGEABLE SUPPLY: Performed by: ORTHOPAEDIC SURGERY

## 2024-03-21 PROCEDURE — 73501 X-RAY EXAM HIP UNI 1 VIEW: CPT

## 2024-03-21 PROCEDURE — 3600000002 HC SURGERY LEVEL 2 BASE: Performed by: ORTHOPAEDIC SURGERY

## 2024-03-21 PROCEDURE — 7100000011 HC PHASE II RECOVERY - ADDTL 15 MIN: Performed by: ORTHOPAEDIC SURGERY

## 2024-03-21 PROCEDURE — 86901 BLOOD TYPING SEROLOGIC RH(D): CPT

## 2024-03-21 PROCEDURE — 97165 OT EVAL LOW COMPLEX 30 MIN: CPT

## 2024-03-21 DEVICE — CUP ACET DIA52MM HIP GRIPTION PRI CEMENTLESS FIX SECT SER: Type: IMPLANTABLE DEVICE | Site: HIP | Status: FUNCTIONAL

## 2024-03-21 DEVICE — HEAD FEM DIA36MM +5MM OFFSET 12/14 TAPR HIP CERAMIC BIOLOX: Type: IMPLANTABLE DEVICE | Site: HIP | Status: FUNCTIONAL

## 2024-03-21 DEVICE — ELIMINATOR H APEX FOR 48-60MM PINN HIP SHELL: Type: IMPLANTABLE DEVICE | Site: HIP | Status: FUNCTIONAL

## 2024-03-21 DEVICE — STEM FEM SZ 5 HIP STD OFFSET CLLRD CEMENTLESS 12/14 TAPR: Type: IMPLANTABLE DEVICE | Site: HIP | Status: FUNCTIONAL

## 2024-03-21 DEVICE — LINER ACET OD52MM ID36MM HIP ALTRX PINN: Type: IMPLANTABLE DEVICE | Site: HIP | Status: FUNCTIONAL

## 2024-03-21 RX ORDER — DEXAMETHASONE SODIUM PHOSPHATE 4 MG/ML
INJECTION, SOLUTION INTRA-ARTICULAR; INTRALESIONAL; INTRAMUSCULAR; INTRAVENOUS; SOFT TISSUE PRN
Status: DISCONTINUED | OUTPATIENT
Start: 2024-03-21 | End: 2024-03-21 | Stop reason: SDUPTHER

## 2024-03-21 RX ORDER — FENTANYL CITRATE 50 UG/ML
25 INJECTION, SOLUTION INTRAMUSCULAR; INTRAVENOUS EVERY 5 MIN PRN
Status: DISCONTINUED | OUTPATIENT
Start: 2024-03-21 | End: 2024-03-21 | Stop reason: HOSPADM

## 2024-03-21 RX ORDER — MAGNESIUM HYDROXIDE 1200 MG/15ML
LIQUID ORAL CONTINUOUS PRN
Status: COMPLETED | OUTPATIENT
Start: 2024-03-21 | End: 2024-03-21

## 2024-03-21 RX ORDER — ONDANSETRON 2 MG/ML
4 INJECTION INTRAMUSCULAR; INTRAVENOUS
Status: DISCONTINUED | OUTPATIENT
Start: 2024-03-21 | End: 2024-03-21 | Stop reason: HOSPADM

## 2024-03-21 RX ORDER — TRANEXAMIC ACID 10 MG/ML
1000 INJECTION, SOLUTION INTRAVENOUS ONCE
Status: COMPLETED | OUTPATIENT
Start: 2024-03-21 | End: 2024-03-21

## 2024-03-21 RX ORDER — DEXMEDETOMIDINE HYDROCHLORIDE 100 UG/ML
INJECTION, SOLUTION INTRAVENOUS PRN
Status: DISCONTINUED | OUTPATIENT
Start: 2024-03-21 | End: 2024-03-21 | Stop reason: SDUPTHER

## 2024-03-21 RX ORDER — FENTANYL CITRATE 50 UG/ML
INJECTION, SOLUTION INTRAMUSCULAR; INTRAVENOUS PRN
Status: DISCONTINUED | OUTPATIENT
Start: 2024-03-21 | End: 2024-03-21 | Stop reason: SDUPTHER

## 2024-03-21 RX ORDER — PROCHLORPERAZINE EDISYLATE 5 MG/ML
5 INJECTION INTRAMUSCULAR; INTRAVENOUS
Status: COMPLETED | OUTPATIENT
Start: 2024-03-21 | End: 2024-03-21

## 2024-03-21 RX ORDER — PROPOFOL 10 MG/ML
INJECTION, EMULSION INTRAVENOUS PRN
Status: DISCONTINUED | OUTPATIENT
Start: 2024-03-21 | End: 2024-03-21 | Stop reason: SDUPTHER

## 2024-03-21 RX ORDER — ONDANSETRON 2 MG/ML
INJECTION INTRAMUSCULAR; INTRAVENOUS PRN
Status: DISCONTINUED | OUTPATIENT
Start: 2024-03-21 | End: 2024-03-21 | Stop reason: SDUPTHER

## 2024-03-21 RX ORDER — NALOXONE HYDROCHLORIDE 0.4 MG/ML
INJECTION, SOLUTION INTRAMUSCULAR; INTRAVENOUS; SUBCUTANEOUS PRN
Status: DISCONTINUED | OUTPATIENT
Start: 2024-03-21 | End: 2024-03-21 | Stop reason: HOSPADM

## 2024-03-21 RX ORDER — SODIUM CHLORIDE, SODIUM LACTATE, POTASSIUM CHLORIDE, CALCIUM CHLORIDE 600; 310; 30; 20 MG/100ML; MG/100ML; MG/100ML; MG/100ML
INJECTION, SOLUTION INTRAVENOUS CONTINUOUS
Status: DISCONTINUED | OUTPATIENT
Start: 2024-03-21 | End: 2024-03-21 | Stop reason: HOSPADM

## 2024-03-21 RX ORDER — METOCLOPRAMIDE HYDROCHLORIDE 5 MG/ML
INJECTION INTRAMUSCULAR; INTRAVENOUS PRN
Status: DISCONTINUED | OUTPATIENT
Start: 2024-03-21 | End: 2024-03-21 | Stop reason: SDUPTHER

## 2024-03-21 RX ORDER — HYDROMORPHONE HYDROCHLORIDE 1 MG/ML
0.5 INJECTION, SOLUTION INTRAMUSCULAR; INTRAVENOUS; SUBCUTANEOUS EVERY 5 MIN PRN
Status: DISCONTINUED | OUTPATIENT
Start: 2024-03-21 | End: 2024-03-21 | Stop reason: HOSPADM

## 2024-03-21 RX ORDER — MIDAZOLAM HYDROCHLORIDE 1 MG/ML
INJECTION INTRAMUSCULAR; INTRAVENOUS PRN
Status: DISCONTINUED | OUTPATIENT
Start: 2024-03-21 | End: 2024-03-21 | Stop reason: SDUPTHER

## 2024-03-21 RX ORDER — SODIUM CHLORIDE 9 MG/ML
INJECTION, SOLUTION INTRAVENOUS PRN
Status: DISCONTINUED | OUTPATIENT
Start: 2024-03-21 | End: 2024-03-21 | Stop reason: HOSPADM

## 2024-03-21 RX ORDER — IPRATROPIUM BROMIDE AND ALBUTEROL SULFATE 2.5; .5 MG/3ML; MG/3ML
1 SOLUTION RESPIRATORY (INHALATION)
Status: DISCONTINUED | OUTPATIENT
Start: 2024-03-21 | End: 2024-03-21 | Stop reason: HOSPADM

## 2024-03-21 RX ORDER — ONDANSETRON 4 MG/1
4 TABLET, ORALLY DISINTEGRATING ORAL EVERY 8 HOURS PRN
Status: DISCONTINUED | OUTPATIENT
Start: 2024-03-21 | End: 2024-03-21 | Stop reason: HOSPADM

## 2024-03-21 RX ORDER — MELOXICAM 15 MG/1
15 TABLET ORAL DAILY
Qty: 30 TABLET | Refills: 0
Start: 2024-03-21 | End: 2024-04-20

## 2024-03-21 RX ORDER — SODIUM CHLORIDE 0.9 % (FLUSH) 0.9 %
5-40 SYRINGE (ML) INJECTION EVERY 12 HOURS SCHEDULED
Status: DISCONTINUED | OUTPATIENT
Start: 2024-03-21 | End: 2024-03-21 | Stop reason: HOSPADM

## 2024-03-21 RX ORDER — LIDOCAINE HYDROCHLORIDE 20 MG/ML
INJECTION, SOLUTION EPIDURAL; INFILTRATION; INTRACAUDAL; PERINEURAL PRN
Status: DISCONTINUED | OUTPATIENT
Start: 2024-03-21 | End: 2024-03-21 | Stop reason: SDUPTHER

## 2024-03-21 RX ORDER — EPHEDRINE SULFATE 5 MG/ML
INJECTION INTRAVENOUS PRN
Status: DISCONTINUED | OUTPATIENT
Start: 2024-03-21 | End: 2024-03-21 | Stop reason: SDUPTHER

## 2024-03-21 RX ORDER — SODIUM CHLORIDE 0.9 % (FLUSH) 0.9 %
5-40 SYRINGE (ML) INJECTION PRN
Status: DISCONTINUED | OUTPATIENT
Start: 2024-03-21 | End: 2024-03-21 | Stop reason: HOSPADM

## 2024-03-21 RX ORDER — OXYCODONE HYDROCHLORIDE 10 MG/1
10 TABLET ORAL EVERY 6 HOURS PRN
Qty: 28 TABLET | Refills: 0
Start: 2024-03-21 | End: 2024-03-28

## 2024-03-21 RX ORDER — ASPIRIN 81 MG/1
81 TABLET ORAL EVERY 12 HOURS
Qty: 42 TABLET | Refills: 0
Start: 2024-03-21 | End: 2024-04-11

## 2024-03-21 RX ORDER — CHLORHEXIDINE GLUCONATE 4 G/100ML
SOLUTION TOPICAL ONCE
Status: DISCONTINUED | OUTPATIENT
Start: 2024-03-21 | End: 2024-03-21 | Stop reason: HOSPADM

## 2024-03-21 RX ORDER — GLYCOPYRROLATE 0.2 MG/ML
INJECTION INTRAMUSCULAR; INTRAVENOUS PRN
Status: DISCONTINUED | OUTPATIENT
Start: 2024-03-21 | End: 2024-03-21 | Stop reason: SDUPTHER

## 2024-03-21 RX ORDER — SODIUM CHLORIDE, SODIUM LACTATE, POTASSIUM CHLORIDE, AND CALCIUM CHLORIDE .6; .31; .03; .02 G/100ML; G/100ML; G/100ML; G/100ML
IRRIGANT IRRIGATION PRN
Status: DISCONTINUED | OUTPATIENT
Start: 2024-03-21 | End: 2024-03-21 | Stop reason: ALTCHOICE

## 2024-03-21 RX ORDER — CEPHALEXIN 500 MG/1
1000 CAPSULE ORAL EVERY 8 HOURS
Qty: 4 CAPSULE | Refills: 0
Start: 2024-03-21 | End: 2024-03-22

## 2024-03-21 RX ADMIN — GLYCOPYRROLATE 0.1 MG: 0.2 INJECTION INTRAMUSCULAR; INTRAVENOUS at 08:31

## 2024-03-21 RX ADMIN — TRANEXAMIC ACID 1 G: 10 INJECTION, SOLUTION INTRAVENOUS at 09:26

## 2024-03-21 RX ADMIN — DEXAMETHASONE SODIUM PHOSPHATE 4 MG: 4 INJECTION, SOLUTION INTRAMUSCULAR; INTRAVENOUS at 08:51

## 2024-03-21 RX ADMIN — DEXMEDETOMIDINE HYDROCHLORIDE 4 MCG: 100 INJECTION, SOLUTION INTRAVENOUS at 08:34

## 2024-03-21 RX ADMIN — FENTANYL CITRATE 50 MCG: 50 INJECTION, SOLUTION INTRAMUSCULAR; INTRAVENOUS at 09:47

## 2024-03-21 RX ADMIN — LIDOCAINE HYDROCHLORIDE 100 MG: 20 INJECTION, SOLUTION EPIDURAL; INFILTRATION; INTRACAUDAL; PERINEURAL at 08:33

## 2024-03-21 RX ADMIN — EPHEDRINE SULFATE 5 MG: 5 INJECTION INTRAVENOUS at 09:32

## 2024-03-21 RX ADMIN — EPHEDRINE SULFATE 5 MG: 5 INJECTION INTRAVENOUS at 08:51

## 2024-03-21 RX ADMIN — EPHEDRINE SULFATE 5 MG: 5 INJECTION INTRAVENOUS at 08:48

## 2024-03-21 RX ADMIN — PROCHLORPERAZINE EDISYLATE 5 MG: 5 INJECTION INTRAMUSCULAR; INTRAVENOUS at 11:04

## 2024-03-21 RX ADMIN — WATER 2000 MG: 1 INJECTION INTRAMUSCULAR; INTRAVENOUS; SUBCUTANEOUS at 08:42

## 2024-03-21 RX ADMIN — DEXMEDETOMIDINE HYDROCHLORIDE 6 MCG: 100 INJECTION, SOLUTION INTRAVENOUS at 08:55

## 2024-03-21 RX ADMIN — PROPOFOL 150 MG: 10 INJECTION, EMULSION INTRAVENOUS at 08:34

## 2024-03-21 RX ADMIN — ONDANSETRON HYDROCHLORIDE 4 MG: 2 INJECTION INTRAMUSCULAR; INTRAVENOUS at 08:55

## 2024-03-21 RX ADMIN — EPHEDRINE SULFATE 5 MG: 5 INJECTION INTRAVENOUS at 09:31

## 2024-03-21 RX ADMIN — HYDROMORPHONE HYDROCHLORIDE 1 MG: 1 INJECTION, SOLUTION INTRAMUSCULAR; INTRAVENOUS; SUBCUTANEOUS at 08:59

## 2024-03-21 RX ADMIN — SODIUM CHLORIDE, SODIUM LACTATE, POTASSIUM CHLORIDE, AND CALCIUM CHLORIDE: 600; 310; 30; 20 INJECTION, SOLUTION INTRAVENOUS at 06:48

## 2024-03-21 RX ADMIN — METOCLOPRAMIDE 5 MG: 5 INJECTION, SOLUTION INTRAMUSCULAR; INTRAVENOUS at 08:53

## 2024-03-21 RX ADMIN — PROPOFOL 50 MG: 10 INJECTION, EMULSION INTRAVENOUS at 09:00

## 2024-03-21 RX ADMIN — SODIUM CHLORIDE, SODIUM LACTATE, POTASSIUM CHLORIDE, AND CALCIUM CHLORIDE: 600; 310; 30; 20 INJECTION, SOLUTION INTRAVENOUS at 09:19

## 2024-03-21 RX ADMIN — EPHEDRINE SULFATE 5 MG: 5 INJECTION INTRAVENOUS at 09:41

## 2024-03-21 RX ADMIN — ONDANSETRON 4 MG: 4 TABLET, ORALLY DISINTEGRATING ORAL at 13:04

## 2024-03-21 RX ADMIN — TRANEXAMIC ACID 1 G: 10 INJECTION, SOLUTION INTRAVENOUS at 08:46

## 2024-03-21 RX ADMIN — MIDAZOLAM 2 MG: 1 INJECTION INTRAMUSCULAR; INTRAVENOUS at 08:29

## 2024-03-21 RX ADMIN — FENTANYL CITRATE 50 MCG: 50 INJECTION, SOLUTION INTRAMUSCULAR; INTRAVENOUS at 10:03

## 2024-03-21 ASSESSMENT — PAIN SCALES - GENERAL
PAINLEVEL_OUTOF10: 0

## 2024-03-21 ASSESSMENT — PAIN - FUNCTIONAL ASSESSMENT: PAIN_FUNCTIONAL_ASSESSMENT: 0-10

## 2024-03-21 ASSESSMENT — PROMIS GLOBAL HEALTH SCALE
IN GENERAL, HOW WOULD YOU RATE YOUR PHYSICAL HEALTH [ON A SCALE OF 1 (POOR) TO 5 (EXCELLENT)]?: GOOD
IN THE PAST 7 DAYS, HOW WOULD YOU RATE YOUR FATIGUE ON AVERAGE [ON A SCALE FROM 1 (NONE) TO 5 (VERY SEVERE)]?: SEVERE
IN THE PAST 7 DAYS, HOW OFTEN HAVE YOU BEEN BOTHERED BY EMOTIONAL PROBLEMS, SUCH AS FEELING ANXIOUS, DEPRESSED, OR IRRITABLE [ON A SCALE FROM 1 (NEVER) TO 5 (ALWAYS)]?: RARELY
IN GENERAL, HOW WOULD YOU RATE YOUR SATISFACTION WITH YOUR SOCIAL ACTIVITIES AND RELATIONSHIPS [ON A SCALE OF 1 (POOR) TO 5 (EXCELLENT)]?: FAIR
IN THE PAST 7 DAYS, HOW WOULD YOU RATE YOUR PAIN ON AVERAGE [ON A SCALE FROM 0 (NO PAIN) TO 10 (WORST IMAGINABLE PAIN)]?: 10 WORST IMAGINABLE PAIN
IN GENERAL, WOULD YOU SAY YOUR HEALTH IS...[ON A SCALE OF 1 (POOR) TO 5 (EXCELLENT)]: FAIR
SUM OF RESPONSES TO QUESTIONS 3, 6, 7, & 8: 17
SUM OF RESPONSES TO QUESTIONS 2, 4, 5, & 10: 11
HOW IS THE PROMIS V1.1 BEING ADMINISTERED?: PAPER
IN GENERAL, HOW WOULD YOU RATE YOUR MENTAL HEALTH, INCLUDING YOUR MOOD AND YOUR ABILITY TO THINK [ON A SCALE OF 1 (POOR) TO 5 (EXCELLENT)]?: FAIR
IN GENERAL, WOULD YOU SAY YOUR QUALITY OF LIFE IS...[ON A SCALE OF 1 (POOR) TO 5 (EXCELLENT)]: GOOD
WHO IS THE PERSON COMPLETING THE PROMIS V1.1 SURVEY?: SELF
TO WHAT EXTENT ARE YOU ABLE TO CARRY OUT YOUR EVERYDAY PHYSICAL ACTIVITIES SUCH AS WALKING, CLIMBING STAIRS, CARRYING GROCERIES, OR MOVING A CHAIR [ON A SCALE OF 1 (NOT AT ALL) TO 5 (COMPLETELY)]?: A LITTLE
IN GENERAL, PLEASE RATE HOW WELL YOU CARRY OUT YOUR USUAL SOCIAL ACTIVITIES (INCLUDES ACTIVITIES AT HOME, AT WORK, AND IN YOUR COMMUNITY, AND RESPONSIBILITIES AS A PARENT, CHILD, SPOUSE, EMPLOYEE, FRIEND, ETC) [ON A SCALE OF 1 (POOR) TO 5 (EXCELLENT)]?: POOR

## 2024-03-21 ASSESSMENT — HOOS JR
HOOS JR RAW SCORE: 17
BENDING TO THE FLOOR TO PICK UP OBJECT: EXTREME
GOING UP OR DOWN STAIRS: SEVERE
LYING IN BED (TURNING OVER, MAINTAINING HIP POSITION): SEVERE
HOOS JR TOTAL INTERVAL SCORE: 36.363
SITTING: MILD
WALKING ON UNEVEN SURFACE: SEVERE
HOOS JR RAW SCORE: 17

## 2024-03-21 ASSESSMENT — PAIN DESCRIPTION - DESCRIPTORS: DESCRIPTORS: ACHING

## 2024-03-21 ASSESSMENT — LIFESTYLE VARIABLES: SMOKING_STATUS: 0

## 2024-03-21 NOTE — ANESTHESIA PRE PROCEDURE
Substance Use Topics   • Alcohol use: Never                                Counseling given: Not Answered  Tobacco comments: Quit smoking: patient quit smoking 7 months ago      Vital Signs (Current):   Vitals:    03/21/24 0551   BP: 139/80   Pulse: 65   Resp: 16   Temp: 98.1 °F (36.7 °C)   TempSrc: Temporal   SpO2: 100%   Weight: 109.3 kg (241 lb)   Height: 1.626 m (5' 4\")                                              BP Readings from Last 3 Encounters:   03/21/24 139/80       NPO Status: Time of last liquid consumption: 2030                        Time of last solid consumption: 2030                        Date of last liquid consumption: 03/20/24                        Date of last solid food consumption: 03/20/24    BMI:   Wt Readings from Last 3 Encounters:   03/21/24 109.3 kg (241 lb)   03/04/24 106.6 kg (235 lb)     Body mass index is 41.37 kg/m².    CBC:   Lab Results   Component Value Date/Time    WBC 7.6 02/29/2024 02:20 PM    RBC 5.05 02/29/2024 02:20 PM    HGB 11.9 02/29/2024 02:20 PM    HCT 39.8 02/29/2024 02:20 PM    MCV 78.8 02/29/2024 02:20 PM    RDW 15.6 02/29/2024 02:20 PM     02/29/2024 02:20 PM       CMP:   Lab Results   Component Value Date/Time     02/29/2024 02:20 PM    K 4.8 02/29/2024 02:20 PM     02/29/2024 02:20 PM    CO2 29 02/29/2024 02:20 PM    BUN 18 02/29/2024 02:20 PM    CREATININE 0.96 02/29/2024 02:20 PM    AGRATIO 1.0 02/29/2024 02:20 PM    LABGLOM >60 02/29/2024 02:20 PM    GLUCOSE 99 02/29/2024 02:20 PM    PROT 7.8 02/29/2024 02:20 PM    CALCIUM 9.6 02/29/2024 02:20 PM    BILITOT 0.4 02/29/2024 02:20 PM    ALKPHOS 80 02/29/2024 02:20 PM    AST 11 02/29/2024 02:20 PM    ALT 9 02/29/2024 02:20 PM       POC Tests: No results for input(s): \"POCGLU\", \"POCNA\", \"POCK\", \"POCCL\", \"POCBUN\", \"POCHEMO\", \"POCHCT\" in the last 72 hours.    Coags:   Lab Results   Component Value Date/Time    PROTIME 13.1 02/29/2024 02:20 PM    INR 1.0 02/29/2024 02:20 PM    APTT 26.7

## 2024-03-21 NOTE — ANESTHESIA POSTPROCEDURE EVALUATION
Department of Anesthesiology  Postprocedure Note    Patient: Varsha Child  MRN: [id number]  YOB: 1950  Date of evaluation: 3/21/2024    Procedure Summary       Date: 03/21/24 Room / Location: [facility] 05 / [facility] MAIN OR    Anesthesia Start: 0829 Anesthesia Stop: 1006    Procedure: RIGHT TOTAL HIP ARTHROPLASTY ANTERIOR APPROACH WITH C-ARM (Right: Hip) Diagnosis:       Osteoarthritis of right hip, unspecified osteoarthritis type      (Osteoarthritis of right hip, unspecified osteoarthritis type [M16.11])    Surgeons: Stephen Farley DO Responsible Provider: Jono Pate MD    Anesthesia Type: General ASA Status: 3            Anesthesia Type: General    Verna Phase I: Verna Score: 8    Verna Phase II: Verna Score: 10    Anesthesia Post Evaluation    Patient location during evaluation: PACU  Patient participation: complete - patient participated  Level of consciousness: awake and alert  Pain score: 0  Airway patency: patent  Nausea & Vomiting: no nausea and no vomiting  Cardiovascular status: blood pressure returned to baseline  Respiratory status: acceptable  Hydration status: euvolemic    No notable events documented.

## 2024-03-21 NOTE — INTERVAL H&P NOTE
Update History & Physical    The patient's History and Physical was reviewed with the patient and I examined the patient. There was no change. The surgical site was confirmed by the patient and me.     Plan: The risks, benefits, expected outcome, and alternative to the recommended procedure have been discussed with the patient. Patient understands and wants to proceed with the procedure.     Electronically signed by Stephen Farley DO on 3/21/2024 at 7:31 AM

## 2024-03-21 NOTE — BRIEF OP NOTE
Brief Postoperative Note      Patient: Varsha Child  YOB: 1950  MRN: [id number]    Date of Procedure: 3/21/2024    Pre-Op Diagnosis Codes:     * Osteoarthritis of right hip, unspecified osteoarthritis type [M16.11]    Post-Op Diagnosis: Same       Procedure(s):  RIGHT TOTAL HIP ARTHROPLASTY ANTERIOR APPROACH WITH C-ARM    Surgeon(s):  Stephen Farley DO    Assistant:  Physician Assistant: Shefali Troncoso PA-C    Anesthesia: General    Estimated Blood Loss (mL): 300     Complications: None    Specimens:   * No specimens in log *    Implants:  Implant Name Type Inv. Item Serial No.  Lot No. LRB No. Used Action   ELIMINATOR H APEX FOR 48-60MM PINN HIP SHELL - [id number]  ELIMINATOR H APEX FOR 48-60MM PINN HIP SHELL  [facility] [employer] [id number] Right 1 Implanted   CUP ACET [id number] HIP GRIPTION TOMAS CEMENTLESS FIX SECT SER - [id number]  CUP ACET [id number] HIP GRIPTION TOMAS CEMENTLESS FIX SECT SER  [facility] [facility] [id number] Right 1 Implanted   LINER ACET OD52MM ID36MM HIP ALTRX PINN - [id number]  LINER ACET OD52MM ID36MM HIP ALTRX PINN  [facility] [employer] [id number] Right 1 Implanted   STEM FEM SZ 5 HIP STD OFFSET CLLRD CEMENTLESS 12/14 TAPR - [id number]  STEM FEM SZ 5 HIP STD OFFSET CLLRD CEMENTLESS 12/14 TAPR  [facility] [employer] [id number] Right 1 Implanted   HEAD FEM [id number] +5MM OFFSET 12/14 TAPR HIP CERAMIC BIOLOX - [id number]  HEAD FEM [id number] +5MM OFFSET 12/14 TAPR HIP CERAMIC BIOLOX  [facility] [employer] [id number] Right 1 Implanted         Drains: * No LDAs found *    Findings: oa hip      Electronically signed by Stephen Farley DO on 3/21/2024 at 12:14 PM

## 2024-03-21 NOTE — PERIOP NOTE
TRANSFER - IN REPORT:    Verbal report received from FLORENTIN Miller RN on Varsha Child  being received from PACU for routine post-op      Report consisted of patient's Situation, Background, Assessment and   Recommendations(SBAR).     Information from the following report(s) Nurse Handoff Report, Adult Overview, Surgery Report, Intake/Output, and MAR was reviewed with the receiving nurse.    Opportunity for questions and clarification was provided.      Assessment completed upon patient's arrival to unit and care assumed.

## 2024-03-21 NOTE — PERIOP NOTE
Reviewed PTA medication list with patient/caregiver and patient/caregiver denies any additional medications.     Patient admits to having a responsible adult care for them at home for at least 24 hours after surgery.    Patient encouraged to use gown warming system and informed that using said warming gown to regulate body temperature prior to a procedure has been shown to help reduce the risks of blood clots and infection.    Patient's pharmacy of choice verified and documented in PTA medication section.    Dual skin assessment & fall risk band verification completed with A Rakel WELLER.

## 2024-03-21 NOTE — PROGRESS NOTES
Physical Therapy Goals:  Pt goals to be met in 1 day:  Pt will be able to perform supine<>sit SBA for transfer ease at home.  Pt will be able to perform sit<>stand SBA for increased ability to transfer at home safely.  Pt will be able to participate in gait training >50' w/ RW, WBAT, GB and CGA/SBA for improved ability in home upon d/c.  Pt will be able to perform stair training step to pattern, B/U rail and CGA to obtain safe entry into home upon d/c.  Pt will be educated regarding HEP per MD protocol for optimal AROM/strength outcomes.        [x]  Patient has met MD mobilization critieria for d/c home   [x]  Recommend HH with 24 hour adult care   []  Benefit from additional acute PT session to address:      PHYSICAL THERAPY EVALUATION    Patient: Varsha Child (73 y.o. female)  Date: 3/21/2024  Primary Diagnosis: Osteoarthritis of right hip, unspecified osteoarthritis type [M16.11]  Procedure(s) (LRB):  RIGHT TOTAL HIP ARTHROPLASTY ANTERIOR APPROACH WITH C-ARM (Right) Day of Surgery   Precautions: Fall Risk, Weight Bearing, Right Lower Extremity Weight Bearing: Weight Bearing As Tolerated,  ,  ,  ,  ,  , Hip Precautions: Anterior hip precautions  PLOF: Used SBQC for ambulation     ASSESSMENT :  Based on the objective data described below, the patient presents with decreased mobility in regards to bed mobility, transfers, gait quality, gait tolerance, balance, stair negotiation and safety due to R JOSEMANUEL surgery.  Decreased AROM of R hip, dec strength R hip, pain in R hip, decreased sensation of R hip, also impacting functional mobility.  Using numerical pain scale, pt rated pain 5/10 pre/during/post session.  Pt and caregiver (son) ed regarding mobility safety, WB, HEP, ice application/use, elevation, environmental safety and home safe techniques.  Pt sitting in recliner upon arrival.  Able to perform sit<>stand w/ CGA.  Pt continued to need intermittent rest periods, additional time and frequent

## 2024-03-21 NOTE — OP NOTE
The skin was closed using an Exofin skin closure system so that there was no discharge from the incision. This helps contribute to a lower risk of infection.     Stephen Farley,   3/21/2024  12:15 PM

## 2024-03-21 NOTE — PROGRESS NOTES
Occupational Therapy Goals:  Initiated 3/21/2024 to be met within 7-10 days.  Short Term Goals  Time Frame for Short Term Goals: 7 days  Short Term Goal 1: The patient will demonstrate ability to complete LB dressing tasks at supervision level with use of AE as needed.  Short Term Goal 2: The patient will demonstrate ability to complete LB bathing tasks at supervision level with use of AE as needed.  Short Term Goal 3: The patient will demonstrate ability to complete toilet transfers at supervision level.  Short Term Goal 4: The patient will demonstrate ability to complete toileting tasks at supervision level.  Short Term Goal 5: The patient will demonstrate ability to complete grooming tasks standing at sink at supervision level.    OCCUPATIONAL THERAPY EVALUATION    Patient: Varsha Child (73 y.o. female)  Date: 3/21/2024  Primary Diagnosis: Osteoarthritis of right hip, unspecified osteoarthritis type [M16.11]  Procedure(s) (LRB):  RIGHT TOTAL HIP ARTHROPLASTY ANTERIOR APPROACH WITH C-ARM (Right) Day of Surgery   Precautions: Fall Risk, Weight Bearing, Right Lower Extremity Weight Bearing: Weight Bearing As Tolerated,  Hip Precautions: Anterior hip precautions  PLOF: Pt was independent in ADLs and was mod I for ambulation with use of quad cane.    ASSESSMENT : Pt cleared for therapy evaluation by RN. Upon therapist's arrival, pt was seated in recliner with improved levels of alertness but pt continuing to present drowsy. Pt was agreeable to occupational therapy evaluation. Pt seen with PT for second set of skilled hands. OT educated pt regarding the role of occupational therapy. Pt verbalized 100% understanding. OT educated pt regarding anterior hip precautions and weight bearing status s/p R JOSEMANUEL. OT educated pt regarding  the intended wear schedule for compression stockings s/p surgery. OT educated pt regarding use of AE and use of adaptive ADL strategies to improve ease and safety with ADL tasks. Pt

## 2024-03-21 NOTE — PERIOP NOTE
TRANSFER - OUT REPORT:    Verbal report given to NITHIN Miller on Varsha Child  being transferred to [facility] for routine post-op       Report consisted of patient's Situation, Background, Assessment and   Recommendations(SBAR).     Information from the following report(s) Adult Overview, Surgery Report, Intake/Output, and MAR was reviewed with the receiving nurse.           Lines:   Peripheral IV 03/21/24 Left;Posterior;Proximal Forearm (Active)   Site Assessment Clean, dry & intact 03/21/24 1005   Line Status Infusing 03/21/24 1005   Line Care Connections checked and tightened 03/21/24 1005   Phlebitis Assessment No symptoms 03/21/24 1005   Infiltration Assessment 0 03/21/24 1005   Alcohol Cap Used No 03/21/24 1005   Dressing Status Clean, dry & intact 03/21/24 1005   Dressing Type Transparent 03/21/24 1005        Opportunity for questions and clarification was provided.      Patient transported with:  O2 @ 2lpm and Registered Nurse

## 2024-03-21 NOTE — PROGRESS NOTES
Physical Therapy Evaluation Attempt    Chart reviewed.  Pt unable to participate in physical therapy session due to:    []  Eating meal  []  Nausea  []  Dizziness  []  Lethargy  []  Lab Results  []  Blood Transfusion  []  Dialysis  []  Pain  [x]  Other:  Attempted PT evaluation however pt unable to fully participate.  Pt too drowsy to safely participate w/ PT session at this time-falling asleep during questioning, emesis found in bag on side of pt and O2 sat on RA low.  Reapplied 2L O2 via NC.  Will give pt additional time to metabolize anesthesia.      Will attempt later today as pt able to participate safely w/ PT.    Rossana Braun PT

## 2024-03-21 NOTE — PERIOP NOTE
Assisted to BR with staff x 2 and walker - voided mod amount - returned to recliner - vomited small amount of clear emesis - Dr. Pate notified - order received for Zofran

## 2024-03-21 NOTE — PERIOP NOTE
Arrived to Phase 2 - alert but drowsy - MEGAN hose applied - assisted to recliner with staff x 2 and walker - encouraged coughing and deep breathing

## 2024-03-21 NOTE — PROGRESS NOTES
Occupational Therapy Evaluation/Treatment Attempt    Chart reviewed. Attempted Occupational Therapy Evaluation/Treatment, however, patient unable to be seen due to:  []  Nausea/vomiting  []  Eating  []  Pain  []  Patient too lethargic  []  Off Unit for testing/procedure  []  Dialysis treatment in progress   []  Telemetry Results  [x]  Other: Pt presenting drowsy and difficulty staying awake to answer preliminary evaluation question. Pt's 02 noted to be low and therefore supplemental 02 was applied. RN notified.     Will follow up later as patient's schedule allows.   Thank you for this referral.  Melania Anderson, OTR/L

## (undated) DEVICE — GLOVE SURG SZ 85 L12IN FNGR ORTHO 126MIL CRM LTX FREE

## (undated) DEVICE — SYRINGE MED 30ML STD CLR PLAS LUERLOCK TIP N CTRL DISP

## (undated) DEVICE — GOWN,SIRUS,NONRNF,SETINSLV,XL,20/CS: Brand: MEDLINE

## (undated) DEVICE — SOLUTION IRRIG 500ML 0.9% SOD CHLO USP POUR PLAS BTL

## (undated) DEVICE — NEEDLE SPNL 20GA L3.5IN YEL HUB S STL REG WALL FIT STYL W/

## (undated) DEVICE — DRESSING ALG W4XL8IN AG FOAM SUPERABSORBENT SIL ANTIMIC

## (undated) DEVICE — GLOVE ORANGE PI 7   MSG9070

## (undated) DEVICE — PACK PROCEDURE SURG ANTR HIP

## (undated) DEVICE — GLOVE ORANGE PI 8 1/2   MSG9085

## (undated) DEVICE — SOLUTION IV 1000ML LAC RINGERS PH 6.5 INJ USP VIAFLX PLAS

## (undated) DEVICE — ADHESIVE SKIN CLOSURE WND 8.661X1.5 IN 22 CM LIQUIBAND SECUR

## (undated) DEVICE — GARMENT,MEDLINE,DVT,INT,CALF,MED, GEN2: Brand: MEDLINE

## (undated) DEVICE — BONE WAX WHITE: Brand: BONE WAX WHITE

## (undated) DEVICE — ELECTRODE PT RET AD L9FT HI MOIST COND ADH HYDRGEL CORDED

## (undated) DEVICE — HOOD, PEEL-AWAY: Brand: FLYTE

## (undated) DEVICE — SUTURE VCRL + SZ 2-0 L27IN ABSRB UD CT-2 L26MM 1/2 CIR TAPR VCP269H

## (undated) DEVICE — SUTURE VCRL + SZ 1 L36IN ABSRB UD L36MM CT-1 1/2 CIR VCP947H

## (undated) DEVICE — GLOVE SURG SZ 7 L12IN FNGR THK79MIL GRN LTX FREE

## (undated) DEVICE — STRYKER PERFORMANCE SERIES SAGITTAL BLADE: Brand: STRYKER PERFORMANCE SERIES

## (undated) DEVICE — TOWEL,OR,DSP,ST,BLUE,STD,4/PK,20PK/CS: Brand: MEDLINE

## (undated) DEVICE — THE CANADY HYBRID PLASMA SCALPEL IS AN ELECTROSURGICAL PLASMA SCALPEL THAT USES AN 85MM BENDABLE PADDLE BLADE TIP. THE ELECTROSURGICAL PLASMA SCALPEL IS USED TO SIMULTANEOUSLY CUT AND COAGULATE BIOLOGICAL TISSUE.: Brand: CANADY HYBRID PLASMA PADDLE BLADE

## (undated) DEVICE — HANDPIECE SET WITH HIGH FLOW TIP AND SUCTION TUBE: Brand: INTERPULSE